# Patient Record
Sex: FEMALE | Race: OTHER | NOT HISPANIC OR LATINO | URBAN - METROPOLITAN AREA
[De-identification: names, ages, dates, MRNs, and addresses within clinical notes are randomized per-mention and may not be internally consistent; named-entity substitution may affect disease eponyms.]

---

## 2021-12-18 ENCOUNTER — EMERGENCY (EMERGENCY)
Facility: HOSPITAL | Age: 61
LOS: 1 days | Discharge: HOME | End: 2021-12-18
Attending: EMERGENCY MEDICINE
Payer: MEDICAID

## 2021-12-18 VITALS
OXYGEN SATURATION: 94 % | RESPIRATION RATE: 18 BRPM | DIASTOLIC BLOOD PRESSURE: 86 MMHG | SYSTOLIC BLOOD PRESSURE: 131 MMHG | WEIGHT: 132.06 LBS | HEART RATE: 85 BPM | TEMPERATURE: 98 F | HEIGHT: 62 IN

## 2021-12-18 DIAGNOSIS — Z96.659 PRESENCE OF UNSPECIFIED ARTIFICIAL KNEE JOINT: Chronic | ICD-10-CM

## 2021-12-18 LAB
ALBUMIN SERPL ELPH-MCNC: 3.6 G/DL — SIGNIFICANT CHANGE UP (ref 3.5–5)
ALP SERPL-CCNC: 60 U/L — SIGNIFICANT CHANGE UP (ref 40–120)
ALT FLD-CCNC: 31 U/L DA — SIGNIFICANT CHANGE UP (ref 10–60)
ANION GAP SERPL CALC-SCNC: 3 MMOL/L — LOW (ref 5–17)
APPEARANCE UR: CLEAR — SIGNIFICANT CHANGE UP
APTT BLD: 33.2 SEC — SIGNIFICANT CHANGE UP (ref 27.5–35.5)
AST SERPL-CCNC: 18 U/L — SIGNIFICANT CHANGE UP (ref 10–40)
BACTERIA # UR AUTO: ABNORMAL /HPF
BASOPHILS # BLD AUTO: 0.05 K/UL — SIGNIFICANT CHANGE UP (ref 0–0.2)
BASOPHILS NFR BLD AUTO: 0.8 % — SIGNIFICANT CHANGE UP (ref 0–2)
BILIRUB SERPL-MCNC: 0.4 MG/DL — SIGNIFICANT CHANGE UP (ref 0.2–1.2)
BILIRUB UR-MCNC: NEGATIVE — SIGNIFICANT CHANGE UP
BUN SERPL-MCNC: 8 MG/DL — SIGNIFICANT CHANGE UP (ref 7–18)
CALCIUM SERPL-MCNC: 9 MG/DL — SIGNIFICANT CHANGE UP (ref 8.4–10.5)
CHLORIDE SERPL-SCNC: 112 MMOL/L — HIGH (ref 96–108)
CO2 SERPL-SCNC: 28 MMOL/L — SIGNIFICANT CHANGE UP (ref 22–31)
COLOR SPEC: YELLOW — SIGNIFICANT CHANGE UP
CREAT SERPL-MCNC: 0.66 MG/DL — SIGNIFICANT CHANGE UP (ref 0.5–1.3)
D DIMER BLD IA.RAPID-MCNC: <150 NG/ML DDU — SIGNIFICANT CHANGE UP
DIFF PNL FLD: ABNORMAL
EOSINOPHIL # BLD AUTO: 0.16 K/UL — SIGNIFICANT CHANGE UP (ref 0–0.5)
EOSINOPHIL NFR BLD AUTO: 2.7 % — SIGNIFICANT CHANGE UP (ref 0–6)
EPI CELLS # UR: ABNORMAL /HPF
FIBRINOGEN PPP-MCNC: 428 MG/DL — SIGNIFICANT CHANGE UP (ref 290–520)
GLUCOSE SERPL-MCNC: 88 MG/DL — SIGNIFICANT CHANGE UP (ref 70–99)
GLUCOSE UR QL: NEGATIVE — SIGNIFICANT CHANGE UP
HCT VFR BLD CALC: 41.5 % — SIGNIFICANT CHANGE UP (ref 34.5–45)
HGB BLD-MCNC: 14.1 G/DL — SIGNIFICANT CHANGE UP (ref 11.5–15.5)
IMM GRANULOCYTES NFR BLD AUTO: 0.2 % — SIGNIFICANT CHANGE UP (ref 0–1.5)
INR BLD: 0.91 RATIO — SIGNIFICANT CHANGE UP (ref 0.88–1.16)
KETONES UR-MCNC: NEGATIVE — SIGNIFICANT CHANGE UP
LACTATE SERPL-SCNC: 2.2 MMOL/L — HIGH (ref 0.7–2)
LDH SERPL L TO P-CCNC: 185 U/L — SIGNIFICANT CHANGE UP (ref 120–225)
LEUKOCYTE ESTERASE UR-ACNC: NEGATIVE — SIGNIFICANT CHANGE UP
LYMPHOCYTES # BLD AUTO: 1.64 K/UL — SIGNIFICANT CHANGE UP (ref 1–3.3)
LYMPHOCYTES # BLD AUTO: 27.2 % — SIGNIFICANT CHANGE UP (ref 13–44)
MCHC RBC-ENTMCNC: 31.3 PG — SIGNIFICANT CHANGE UP (ref 27–34)
MCHC RBC-ENTMCNC: 34 GM/DL — SIGNIFICANT CHANGE UP (ref 32–36)
MCV RBC AUTO: 92.2 FL — SIGNIFICANT CHANGE UP (ref 80–100)
MONOCYTES # BLD AUTO: 0.4 K/UL — SIGNIFICANT CHANGE UP (ref 0–0.9)
MONOCYTES NFR BLD AUTO: 6.6 % — SIGNIFICANT CHANGE UP (ref 2–14)
NEUTROPHILS # BLD AUTO: 3.76 K/UL — SIGNIFICANT CHANGE UP (ref 1.8–7.4)
NEUTROPHILS NFR BLD AUTO: 62.5 % — SIGNIFICANT CHANGE UP (ref 43–77)
NITRITE UR-MCNC: NEGATIVE — SIGNIFICANT CHANGE UP
NRBC # BLD: 0 /100 WBCS — SIGNIFICANT CHANGE UP (ref 0–0)
PH UR: 7 — SIGNIFICANT CHANGE UP (ref 5–8)
PLATELET # BLD AUTO: 199 K/UL — SIGNIFICANT CHANGE UP (ref 150–400)
POTASSIUM SERPL-MCNC: 3.9 MMOL/L — SIGNIFICANT CHANGE UP (ref 3.5–5.3)
POTASSIUM SERPL-SCNC: 3.9 MMOL/L — SIGNIFICANT CHANGE UP (ref 3.5–5.3)
PROT SERPL-MCNC: 7.9 G/DL — SIGNIFICANT CHANGE UP (ref 6–8.3)
PROT UR-MCNC: NEGATIVE — SIGNIFICANT CHANGE UP
PROTHROM AB SERPL-ACNC: 10.9 SEC — SIGNIFICANT CHANGE UP (ref 10.6–13.6)
RBC # BLD: 4.5 M/UL — SIGNIFICANT CHANGE UP (ref 3.8–5.2)
RBC # FLD: 12.1 % — SIGNIFICANT CHANGE UP (ref 10.3–14.5)
RBC CASTS # UR COMP ASSIST: ABNORMAL /HPF (ref 0–2)
SODIUM SERPL-SCNC: 143 MMOL/L — SIGNIFICANT CHANGE UP (ref 135–145)
SP GR SPEC: 1.01 — SIGNIFICANT CHANGE UP (ref 1.01–1.02)
UROBILINOGEN FLD QL: NEGATIVE — SIGNIFICANT CHANGE UP
WBC # BLD: 6.02 K/UL — SIGNIFICANT CHANGE UP (ref 3.8–10.5)
WBC # FLD AUTO: 6.02 K/UL — SIGNIFICANT CHANGE UP (ref 3.8–10.5)
WBC UR QL: SIGNIFICANT CHANGE UP /HPF (ref 0–5)

## 2021-12-18 PROCEDURE — 85610 PROTHROMBIN TIME: CPT

## 2021-12-18 PROCEDURE — 83615 LACTATE (LD) (LDH) ENZYME: CPT

## 2021-12-18 PROCEDURE — 99284 EMERGENCY DEPT VISIT MOD MDM: CPT | Mod: 25

## 2021-12-18 PROCEDURE — 81001 URINALYSIS AUTO W/SCOPE: CPT

## 2021-12-18 PROCEDURE — 36415 COLL VENOUS BLD VENIPUNCTURE: CPT

## 2021-12-18 PROCEDURE — 86140 C-REACTIVE PROTEIN: CPT

## 2021-12-18 PROCEDURE — 71045 X-RAY EXAM CHEST 1 VIEW: CPT | Mod: 26

## 2021-12-18 PROCEDURE — 71045 X-RAY EXAM CHEST 1 VIEW: CPT

## 2021-12-18 PROCEDURE — 85025 COMPLETE CBC W/AUTO DIFF WBC: CPT

## 2021-12-18 PROCEDURE — 87040 BLOOD CULTURE FOR BACTERIA: CPT

## 2021-12-18 PROCEDURE — 96374 THER/PROPH/DIAG INJ IV PUSH: CPT

## 2021-12-18 PROCEDURE — 96375 TX/PRO/DX INJ NEW DRUG ADDON: CPT

## 2021-12-18 PROCEDURE — 99284 EMERGENCY DEPT VISIT MOD MDM: CPT | Mod: CS

## 2021-12-18 PROCEDURE — 84145 PROCALCITONIN (PCT): CPT

## 2021-12-18 PROCEDURE — 85379 FIBRIN DEGRADATION QUANT: CPT

## 2021-12-18 PROCEDURE — 85730 THROMBOPLASTIN TIME PARTIAL: CPT

## 2021-12-18 PROCEDURE — 85384 FIBRINOGEN ACTIVITY: CPT

## 2021-12-18 PROCEDURE — 82728 ASSAY OF FERRITIN: CPT

## 2021-12-18 PROCEDURE — 87086 URINE CULTURE/COLONY COUNT: CPT

## 2021-12-18 PROCEDURE — 80053 COMPREHEN METABOLIC PANEL: CPT

## 2021-12-18 PROCEDURE — 83605 ASSAY OF LACTIC ACID: CPT

## 2021-12-18 RX ORDER — CEFTRIAXONE 500 MG/1
1000 INJECTION, POWDER, FOR SOLUTION INTRAMUSCULAR; INTRAVENOUS ONCE
Refills: 0 | Status: COMPLETED | OUTPATIENT
Start: 2021-12-18 | End: 2021-12-18

## 2021-12-18 RX ORDER — AZITHROMYCIN 500 MG/1
500 TABLET, FILM COATED ORAL ONCE
Refills: 0 | Status: COMPLETED | OUTPATIENT
Start: 2021-12-18 | End: 2021-12-18

## 2021-12-18 RX ORDER — KETOROLAC TROMETHAMINE 30 MG/ML
30 SYRINGE (ML) INJECTION ONCE
Refills: 0 | Status: DISCONTINUED | OUTPATIENT
Start: 2021-12-18 | End: 2021-12-18

## 2021-12-18 RX ORDER — SODIUM CHLORIDE 9 MG/ML
1000 INJECTION INTRAMUSCULAR; INTRAVENOUS; SUBCUTANEOUS
Refills: 0 | Status: DISCONTINUED | OUTPATIENT
Start: 2021-12-18 | End: 2021-12-22

## 2021-12-18 RX ORDER — ACETAMINOPHEN 500 MG
650 TABLET ORAL ONCE
Refills: 0 | Status: COMPLETED | OUTPATIENT
Start: 2021-12-18 | End: 2021-12-18

## 2021-12-18 RX ADMIN — AZITHROMYCIN 255 MILLIGRAM(S): 500 TABLET, FILM COATED ORAL at 20:25

## 2021-12-18 RX ADMIN — SODIUM CHLORIDE 125 MILLILITER(S): 9 INJECTION INTRAMUSCULAR; INTRAVENOUS; SUBCUTANEOUS at 20:54

## 2021-12-18 RX ADMIN — CEFTRIAXONE 100 MILLIGRAM(S): 500 INJECTION, POWDER, FOR SOLUTION INTRAMUSCULAR; INTRAVENOUS at 20:25

## 2021-12-18 RX ADMIN — Medication 650 MILLIGRAM(S): at 20:25

## 2021-12-18 RX ADMIN — Medication 30 MILLIGRAM(S): at 20:54

## 2021-12-18 NOTE — ED ADULT NURSE NOTE - OBJECTIVE STATEMENT
Patient states that she was told by primary doctor to come to hospital after positive Covid test. She states she has bodyaches, dizziness. She is AAOx4, in no acute distress.

## 2021-12-18 NOTE — ED PROVIDER NOTE - PATIENT PORTAL LINK FT
You can access the FollowMyHealth Patient Portal offered by [facility] by registering at the following website: [email or web address]. By joining [facility]’s FollowMyHealth portal, you will also be able to view your health information using other applications (apps) compatible with our system.

## 2021-12-18 NOTE — ED PROVIDER NOTE - PROGRESS NOTE DETAILS
Pt in no distress, walked pt , O2 sat 95%, will d/c home.  Advised to quarantine for 10 days, f/u with PMD.  Also to monitor pulse ox & HR

## 2021-12-18 NOTE — ED PROVIDER NOTE - OBJECTIVE STATEMENT
61 y.o. female BIBA pt claims tested positive for COVID yest., pt c/o HA, dizziness, palpitations, inc. fatigue, myalgia,x3 days, good appetite, no sob, coughing, CP, n/v, diarrhea.  Pt has Moderna Booster.  Pt's living with her boyfriend.  Pt took Tylenol with improvement

## 2021-12-19 VITALS
HEART RATE: 88 BPM | SYSTOLIC BLOOD PRESSURE: 117 MMHG | RESPIRATION RATE: 18 BRPM | DIASTOLIC BLOOD PRESSURE: 81 MMHG | TEMPERATURE: 98 F | OXYGEN SATURATION: 96 %

## 2021-12-19 LAB
CRP SERPL-MCNC: <3 MG/L — SIGNIFICANT CHANGE UP
CULTURE RESULTS: SIGNIFICANT CHANGE UP
FERRITIN SERPL-MCNC: 60 NG/ML — SIGNIFICANT CHANGE UP (ref 15–150)
PROCALCITONIN SERPL-MCNC: 0.02 NG/ML — SIGNIFICANT CHANGE UP (ref 0.02–0.1)
SPECIMEN SOURCE: SIGNIFICANT CHANGE UP

## 2021-12-24 LAB
CULTURE RESULTS: SIGNIFICANT CHANGE UP
CULTURE RESULTS: SIGNIFICANT CHANGE UP
SPECIMEN SOURCE: SIGNIFICANT CHANGE UP
SPECIMEN SOURCE: SIGNIFICANT CHANGE UP

## 2022-12-19 ENCOUNTER — LABORATORY RESULT (OUTPATIENT)
Age: 62
End: 2022-12-19

## 2022-12-19 ENCOUNTER — APPOINTMENT (OUTPATIENT)
Dept: PULMONOLOGY | Facility: CLINIC | Age: 62
End: 2022-12-19

## 2022-12-19 ENCOUNTER — OUTPATIENT (OUTPATIENT)
Dept: OUTPATIENT SERVICES | Facility: HOSPITAL | Age: 62
LOS: 1 days | End: 2022-12-19
Payer: MEDICARE

## 2022-12-19 VITALS
OXYGEN SATURATION: 98 % | SYSTOLIC BLOOD PRESSURE: 117 MMHG | TEMPERATURE: 98.1 F | HEIGHT: 59 IN | DIASTOLIC BLOOD PRESSURE: 77 MMHG | RESPIRATION RATE: 12 BRPM | WEIGHT: 120 LBS | BODY MASS INDEX: 24.19 KG/M2 | HEART RATE: 67 BPM

## 2022-12-19 DIAGNOSIS — R06.00 DYSPNEA, UNSPECIFIED: ICD-10-CM

## 2022-12-19 DIAGNOSIS — Z96.659 PRESENCE OF UNSPECIFIED ARTIFICIAL KNEE JOINT: Chronic | ICD-10-CM

## 2022-12-19 DIAGNOSIS — Z23 ENCOUNTER FOR IMMUNIZATION: ICD-10-CM

## 2022-12-19 PROBLEM — Z00.00 ENCOUNTER FOR PREVENTIVE HEALTH EXAMINATION: Status: ACTIVE | Noted: 2022-12-19

## 2022-12-19 PROCEDURE — 71046 X-RAY EXAM CHEST 2 VIEWS: CPT

## 2022-12-19 PROCEDURE — 71046 X-RAY EXAM CHEST 2 VIEWS: CPT | Mod: 26

## 2022-12-19 PROCEDURE — 99205 OFFICE O/P NEW HI 60 MIN: CPT

## 2022-12-19 NOTE — ASSESSMENT
[FreeTextEntry1] : Impression/Plan:\par 1. Fatigue\par 2. Chronic Dyspnea on exertion post covid-19\par unclar etiology\par - check cxr, pft, 6mwt\par - check rheum work up given dry eyes, mouth, and ulnar deviation of the fingers\par \par RTC after completion of above. \par

## 2022-12-19 NOTE — HISTORY OF PRESENT ILLNESS
[TextBox_4] :  ID [id number]\par 61 yo F with past medical history of HTN, GERD, hyperlipidemia referred by PCP for evaluation of fatigue and shortness of breath. \par 1 year of fatigue, shortness of breath which worsened 2 week ago. She went ot [facility] for these symptoms and dc'ed from ER, told "everything ws normal". Does not have any dc papers from [place].  Reports that since getting covid-19 infection in   she has not been feeling well. With regards to covid-19 infection,  She was in the ER ut not admitted and did not receive any meds (paxlovid or monoclonal antibody). When she is not feeling tired, can walk 10 blocks and when is tired, cannnot even take a shower without feeling short of breath. ROS positive for dry eyes, dry mouth, redness of face during shower,  and feeling of "upper teeth are falling" and she has to constant keep her mouth open. \par PSxHx: knee surgery \par SH: never smoker. Born [place], been in the [place] 28 years. 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 no pets. \par Allergies: rifampin, azithromycin, paxil\par Meds: Carvedilol, diltiazem, omeprazole 20, aspirin, diphenhydramine, melatonin, atorvastatin, vitamin D3, bcomplex, citracal, folic acid  [ESS] : 0

## 2022-12-19 NOTE — PHYSICAL EXAM
[No Acute Distress] : no acute distress [Supple] : supple [Normal Rate/Rhythm] : normal rate/rhythm [Normal S1, S2] : normal s1, s2 [No Murmurs] : no murmurs [No Resp Distress] : no resp distress [No Acc Muscle Use] : no acc muscle use [Clear to Auscultation Bilaterally] : clear to auscultation bilaterally [Gait - Sufficient For Exercise Testing] : gait sufficient for exercise testing [No Clubbing] : no clubbing [No Edema] : no edema [Oriented x3] : oriented x3 [Normal Affect] : normal affect [TextBox_105] : b/l fingers have slight ulnar deviation,

## 2022-12-19 NOTE — CONSULT LETTER
[Dear  ___] : Dear  [unfilled], [Courtesy Letter:] : I had the pleasure of seeing your patient, [unfilled], in my office today. [Please see my note below.] : Please see my note below. [Consult Closing:] : Thank you very much for allowing me to participate in the care of this patient.  If you have any questions, please do not hesitate to contact me. [Sincerely,] : Sincerely, [DrJoanna  ___] : Dr. MARTÍNEZ [FreeTextEntry2] : Ryan Kohli  [FreeTextEntry3] : Dr Lopez

## 2022-12-19 NOTE — REVIEW OF SYSTEMS
[Fatigue] : fatigue [Dry Eyes] : dry eyes [SOB on Exertion] : sob on exertion [Dizziness] : dizziness [Negative] : Cardiovascular [Fever] : no fever [Cough] : no cough [Sputum] : no sputum [GERD] : no gerd [Abdominal Pain] : no abdominal pain [Diarrhea] : no diarrhea

## 2022-12-20 LAB
BASOPHILS # BLD AUTO: 0.04 K/UL
BASOPHILS NFR BLD AUTO: 0.7 %
C3 SERPL-MCNC: 121 MG/DL
EOSINOPHIL # BLD AUTO: 0.23 K/UL
EOSINOPHIL NFR BLD AUTO: 4 %
ERYTHROCYTE [SEDIMENTATION RATE] IN BLOOD BY WESTERGREN METHOD: 22 MM/HR
HCT VFR BLD CALC: 43.2 %
HGB BLD-MCNC: 14 G/DL
IMM GRANULOCYTES NFR BLD AUTO: 0.2 %
LYMPHOCYTES # BLD AUTO: 1.34 K/UL
LYMPHOCYTES NFR BLD AUTO: 23.5 %
MAN DIFF?: NORMAL
MCHC RBC-ENTMCNC: 30.7 PG
MCHC RBC-ENTMCNC: 32.4 GM/DL
MCV RBC AUTO: 94.7 FL
MONOCYTES # BLD AUTO: 0.32 K/UL
MONOCYTES NFR BLD AUTO: 5.6 %
NEUTROPHILS # BLD AUTO: 3.76 K/UL
NEUTROPHILS NFR BLD AUTO: 66 %
PLATELET # BLD AUTO: 164 K/UL
RBC # BLD: 4.56 M/UL
RBC # FLD: 12.4 %
RHEUMATOID FACT SER QL: <10 IU/ML
WBC # FLD AUTO: 5.7 K/UL

## 2022-12-21 LAB
ACE BLD-CCNC: 35 U/L
ANA SER IF-ACNC: NEGATIVE
CARDIOLIPIN IGM SER-MCNC: 9 MPL
CARDIOLIPIN IGM SER-MCNC: <5 GPL
CCP AB SER IA-ACNC: <8 UNITS
CENTROMERE IGG SER-ACNC: <0.2 CD:130001892
DEPRECATED CARDIOLIPIN IGA SER: 5.5 APL
DSDNA AB SER-ACNC: <12 IU/ML
ENA RNP AB SER IA-ACNC: 0.5 AL
ENA SCL70 IGG SER IA-ACNC: <0.2 AL
ENA SM AB SER IA-ACNC: <0.2 AL
ENA SS-A AB SER IA-ACNC: <0.2 AL
ENA SS-B AB SER IA-ACNC: <0.2 AL
M TB IFN-G BLD-IMP: NEGATIVE
QUANTIFERON TB PLUS MITOGEN MINUS NIL: 1.33 IU/ML
QUANTIFERON TB PLUS NIL: 0.03 IU/ML
QUANTIFERON TB PLUS TB1 MINUS NIL: 0 IU/ML
QUANTIFERON TB PLUS TB2 MINUS NIL: 0.02 IU/ML
RF+CCP IGG SER-IMP: NEGATIVE

## 2022-12-22 LAB
ALDOLASE SERPL-CCNC: 5.7 U/L
B2 GLYCOPROT1 IGM SER-ACNC: <5 SMU

## 2023-01-10 ENCOUNTER — APPOINTMENT (OUTPATIENT)
Dept: PULMONOLOGY | Facility: CLINIC | Age: 63
End: 2023-01-10
Payer: MEDICAID

## 2023-01-10 PROCEDURE — ZZZZZ: CPT

## 2023-01-10 PROCEDURE — 94010 BREATHING CAPACITY TEST: CPT

## 2023-01-10 PROCEDURE — 94727 GAS DIL/WSHOT DETER LNG VOL: CPT

## 2023-01-10 PROCEDURE — 94618 PULMONARY STRESS TESTING: CPT

## 2023-01-10 PROCEDURE — 94729 DIFFUSING CAPACITY: CPT

## 2023-01-17 ENCOUNTER — APPOINTMENT (OUTPATIENT)
Dept: PULMONOLOGY | Facility: CLINIC | Age: 63
End: 2023-01-17
Payer: MEDICAID

## 2023-01-17 VITALS
DIASTOLIC BLOOD PRESSURE: 81 MMHG | HEIGHT: 59 IN | WEIGHT: 121 LBS | OXYGEN SATURATION: 97 % | SYSTOLIC BLOOD PRESSURE: 131 MMHG | RESPIRATION RATE: 12 BRPM | TEMPERATURE: 97.9 F | BODY MASS INDEX: 24.39 KG/M2 | HEART RATE: 79 BPM

## 2023-01-17 DIAGNOSIS — R53.83 OTHER FATIGUE: ICD-10-CM

## 2023-01-17 DIAGNOSIS — G47.00 INSOMNIA, UNSPECIFIED: ICD-10-CM

## 2023-01-17 PROCEDURE — 99214 OFFICE O/P EST MOD 30 MIN: CPT

## 2023-01-17 NOTE — HISTORY OF PRESENT ILLNESS
[Never] : never [TextBox_4] :  ID [id number]\par 12-19-22 \par 63 yo F with past medical history of HTN, GERD, hyperlipidemia referred by PCP for evaluation of fatigue and shortness of breath. \par 1 year of fatigue, shortness of breath which worsened 2 week ago. She went ot [facility] for these symptoms and dc'ed from ER, told "everything ws normal". Does not have any dc papers from [place].  Reports that since getting covid-19 infection in   she has not been feeling well. With regards to covid-19 infection,  She was in the ER ut not admitted and did not receive any meds (paxlovid or monoclonal antibody). When she is not feeling tired, can walk 10 blocks and when is tired, cannnot even take a shower without feeling short of breath. ROS positive for dry eyes, dry mouth, redness of face during shower,  and feeling of "upper teeth are falling" and she has to constant keep her mouth open. \par PSxHx: knee surgery \par SH: never smoker. [place], [place] years. 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 no pets. \par Allergies: rifampin, azithromycin, paxil\par Meds: Carvedilol, diltiazem, omeprazole 20, aspirin, diphenhydramine, melatonin, atorvastatin, vitamin D3, bcomplex, citracal, folic acid \par \par 1-17-23 improved dyspnea on exertion. reports that when she does not sleep well she feel sfatigued. she does use melatonin almost nightly to sleep and overall has poor sleep hygeine.  [ESS] : 0

## 2023-01-17 NOTE — REVIEW OF SYSTEMS
[Dizziness] : dizziness [Negative] : Cardiovascular [Fever] : no fever [Fatigue] : no fatigue [Dry Eyes] : no dry eyes [Cough] : no cough [Sputum] : no sputum [SOB on Exertion] : no sob on exertion [GERD] : no gerd [Abdominal Pain] : no abdominal pain [Diarrhea] : no diarrhea

## 2023-01-17 NOTE — PHYSICAL EXAM
[No Acute Distress] : no acute distress [Supple] : supple [Normal Rate/Rhythm] : normal rate/rhythm [Normal S1, S2] : normal s1, s2 [No Resp Distress] : no resp distress [No Acc Muscle Use] : no acc muscle use [Clear to Auscultation Bilaterally] : clear to auscultation bilaterally [Gait - Sufficient For Exercise Testing] : gait sufficient for exercise testing [No Clubbing] : no clubbing [No Edema] : no edema [Oriented x3] : oriented x3 [Normal Affect] : normal affect [TextBox_105] : b/l fingers have slight ulnar deviation,

## 2023-01-17 NOTE — ASSESSMENT
[FreeTextEntry1] : Data Reviewed: \par Rheumatologic work up unremarkable\par CXR 12-19-22: degenerative changes in the spine. clear lung fields \par PFT 1-10-23 FVC 94%, FEV1 101%, FEV1/FVC 0.83. TLC 83%, DLCO 82%. NO exercise desaturation \par \par Impression/Plan:\par 1. Fatigue\par 2. Chronic Dyspnea on exertion post covid-19\par - pft, cxr and rheum work up all unremarakable\par - patient reports feeling better and she is not sure what changed\par - reassurance provided\par \par 3. insomnia\par - we discussed sleep hygeine \par - obtain HST \par \par Will discuss hst results over the phone. \par

## 2023-01-17 NOTE — CONSULT LETTER
[Dear  ___] : Dear  [unfilled], [Courtesy Letter:] : I had the pleasure of seeing your patient, [unfilled], in my office today. [Please see my note below.] : Please see my note below. [Consult Closing:] : Thank you very much for allowing me to participate in the care of this patient.  If you have any questions, please do not hesitate to contact me. [Sincerely,] : Sincerely, [DrJoanna  ___] : Dr. MARTÍNEZ [FreeTextEntry2] : Ryan Kohli  [FreeTextEntry3] : Ryan Kohli

## 2023-02-06 ENCOUNTER — EMERGENCY (EMERGENCY)
Facility: HOSPITAL | Age: 63
LOS: 1 days | Discharge: ANOTHER HOSPITAL | End: 2023-02-06
Attending: EMERGENCY MEDICINE
Payer: MEDICAID

## 2023-02-06 ENCOUNTER — INPATIENT (INPATIENT)
Facility: HOSPITAL | Age: 63
LOS: 2 days | Discharge: HOME | DRG: 185 | End: 2023-02-09
Attending: SURGERY | Admitting: SURGERY
Payer: MEDICAID

## 2023-02-06 VITALS
DIASTOLIC BLOOD PRESSURE: 63 MMHG | RESPIRATION RATE: 18 BRPM | SYSTOLIC BLOOD PRESSURE: 95 MMHG | HEART RATE: 99 BPM | TEMPERATURE: 100 F | OXYGEN SATURATION: 94 %

## 2023-02-06 VITALS
WEIGHT: 115.08 LBS | DIASTOLIC BLOOD PRESSURE: 72 MMHG | HEIGHT: 65 IN | HEART RATE: 70 BPM | SYSTOLIC BLOOD PRESSURE: 101 MMHG | RESPIRATION RATE: 18 BRPM | OXYGEN SATURATION: 95 % | TEMPERATURE: 98 F

## 2023-02-06 VITALS
HEART RATE: 100 BPM | SYSTOLIC BLOOD PRESSURE: 103 MMHG | OXYGEN SATURATION: 95 % | RESPIRATION RATE: 25 BRPM | DIASTOLIC BLOOD PRESSURE: 71 MMHG | TEMPERATURE: 101 F

## 2023-02-06 DIAGNOSIS — S22.42XA MULTIPLE FRACTURES OF RIBS, LEFT SIDE, INITIAL ENCOUNTER FOR CLOSED FRACTURE: ICD-10-CM

## 2023-02-06 DIAGNOSIS — Z96.659 PRESENCE OF UNSPECIFIED ARTIFICIAL KNEE JOINT: Chronic | ICD-10-CM

## 2023-02-06 LAB
ALBUMIN SERPL ELPH-MCNC: 3.3 G/DL — LOW (ref 3.5–5)
ALBUMIN SERPL ELPH-MCNC: 3.9 G/DL — SIGNIFICANT CHANGE UP (ref 3.3–5)
ALP SERPL-CCNC: 47 U/L — SIGNIFICANT CHANGE UP (ref 40–120)
ALP SERPL-CCNC: 53 U/L — SIGNIFICANT CHANGE UP (ref 40–120)
ALT FLD-CCNC: 22 U/L — SIGNIFICANT CHANGE UP (ref 10–45)
ALT FLD-CCNC: 32 U/L DA — SIGNIFICANT CHANGE UP (ref 10–60)
ANION GAP SERPL CALC-SCNC: 14 MMOL/L — SIGNIFICANT CHANGE UP (ref 5–17)
ANION GAP SERPL CALC-SCNC: 8 MMOL/L — SIGNIFICANT CHANGE UP (ref 5–17)
APPEARANCE UR: CLEAR — SIGNIFICANT CHANGE UP
AST SERPL-CCNC: 22 U/L — SIGNIFICANT CHANGE UP (ref 10–40)
AST SERPL-CCNC: 30 U/L — SIGNIFICANT CHANGE UP (ref 10–40)
BASOPHILS # BLD AUTO: 0.01 K/UL — SIGNIFICANT CHANGE UP (ref 0–0.2)
BASOPHILS # BLD AUTO: 0.01 K/UL — SIGNIFICANT CHANGE UP (ref 0–0.2)
BASOPHILS NFR BLD AUTO: 0.1 % — SIGNIFICANT CHANGE UP (ref 0–2)
BASOPHILS NFR BLD AUTO: 0.1 % — SIGNIFICANT CHANGE UP (ref 0–2)
BILIRUB SERPL-MCNC: 0.3 MG/DL — SIGNIFICANT CHANGE UP (ref 0.2–1.2)
BILIRUB SERPL-MCNC: 0.5 MG/DL — SIGNIFICANT CHANGE UP (ref 0.2–1.2)
BILIRUB UR-MCNC: NEGATIVE — SIGNIFICANT CHANGE UP
BLD GP AB SCN SERPL QL: NEGATIVE — SIGNIFICANT CHANGE UP
BUN SERPL-MCNC: 21 MG/DL — HIGH (ref 7–18)
BUN SERPL-MCNC: 23 MG/DL — SIGNIFICANT CHANGE UP (ref 7–23)
CALCIUM SERPL-MCNC: 8.4 MG/DL — SIGNIFICANT CHANGE UP (ref 8.4–10.5)
CALCIUM SERPL-MCNC: 8.4 MG/DL — SIGNIFICANT CHANGE UP (ref 8.4–10.5)
CHLORIDE SERPL-SCNC: 107 MMOL/L — SIGNIFICANT CHANGE UP (ref 96–108)
CHLORIDE SERPL-SCNC: 110 MMOL/L — HIGH (ref 96–108)
CO2 SERPL-SCNC: 20 MMOL/L — LOW (ref 22–31)
CO2 SERPL-SCNC: 22 MMOL/L — SIGNIFICANT CHANGE UP (ref 22–31)
COLOR SPEC: YELLOW — SIGNIFICANT CHANGE UP
CREAT SERPL-MCNC: 0.66 MG/DL — SIGNIFICANT CHANGE UP (ref 0.5–1.3)
CREAT SERPL-MCNC: 0.72 MG/DL — SIGNIFICANT CHANGE UP (ref 0.5–1.3)
DIFF PNL FLD: ABNORMAL
EGFR: 94 ML/MIN/1.73M2 — SIGNIFICANT CHANGE UP
EGFR: 99 ML/MIN/1.73M2 — SIGNIFICANT CHANGE UP
EOSINOPHIL # BLD AUTO: 0.01 K/UL — SIGNIFICANT CHANGE UP (ref 0–0.5)
EOSINOPHIL # BLD AUTO: 0.05 K/UL — SIGNIFICANT CHANGE UP (ref 0–0.5)
EOSINOPHIL NFR BLD AUTO: 0.1 % — SIGNIFICANT CHANGE UP (ref 0–6)
EOSINOPHIL NFR BLD AUTO: 0.6 % — SIGNIFICANT CHANGE UP (ref 0–6)
FLUAV AG NPH QL: SIGNIFICANT CHANGE UP
FLUAV AG NPH QL: SIGNIFICANT CHANGE UP
FLUBV AG NPH QL: SIGNIFICANT CHANGE UP
FLUBV AG NPH QL: SIGNIFICANT CHANGE UP
GLUCOSE SERPL-MCNC: 135 MG/DL — HIGH (ref 70–99)
GLUCOSE SERPL-MCNC: 207 MG/DL — HIGH (ref 70–99)
GLUCOSE UR QL: NEGATIVE — SIGNIFICANT CHANGE UP
HCT VFR BLD CALC: 39.3 % — SIGNIFICANT CHANGE UP (ref 34.5–45)
HCT VFR BLD CALC: 41.7 % — SIGNIFICANT CHANGE UP (ref 34.5–45)
HGB BLD-MCNC: 13.4 G/DL — SIGNIFICANT CHANGE UP (ref 11.5–15.5)
HGB BLD-MCNC: 14.3 G/DL — SIGNIFICANT CHANGE UP (ref 11.5–15.5)
IMM GRANULOCYTES NFR BLD AUTO: 0.3 % — SIGNIFICANT CHANGE UP (ref 0–0.9)
IMM GRANULOCYTES NFR BLD AUTO: 0.3 % — SIGNIFICANT CHANGE UP (ref 0–0.9)
KETONES UR-MCNC: NEGATIVE — SIGNIFICANT CHANGE UP
LEUKOCYTE ESTERASE UR-ACNC: ABNORMAL
LIDOCAIN IGE QN: 17 U/L — SIGNIFICANT CHANGE UP (ref 7–60)
LYMPHOCYTES # BLD AUTO: 0.16 K/UL — LOW (ref 1–3.3)
LYMPHOCYTES # BLD AUTO: 0.21 K/UL — LOW (ref 1–3.3)
LYMPHOCYTES # BLD AUTO: 2.3 % — LOW (ref 13–44)
LYMPHOCYTES # BLD AUTO: 2.6 % — LOW (ref 13–44)
MCHC RBC-ENTMCNC: 31.8 PG — SIGNIFICANT CHANGE UP (ref 27–34)
MCHC RBC-ENTMCNC: 31.8 PG — SIGNIFICANT CHANGE UP (ref 27–34)
MCHC RBC-ENTMCNC: 34.1 GM/DL — SIGNIFICANT CHANGE UP (ref 32–36)
MCHC RBC-ENTMCNC: 34.3 GM/DL — SIGNIFICANT CHANGE UP (ref 32–36)
MCV RBC AUTO: 92.9 FL — SIGNIFICANT CHANGE UP (ref 80–100)
MCV RBC AUTO: 93.1 FL — SIGNIFICANT CHANGE UP (ref 80–100)
MONOCYTES # BLD AUTO: 0.14 K/UL — SIGNIFICANT CHANGE UP (ref 0–0.9)
MONOCYTES # BLD AUTO: 0.14 K/UL — SIGNIFICANT CHANGE UP (ref 0–0.9)
MONOCYTES NFR BLD AUTO: 1.8 % — LOW (ref 2–14)
MONOCYTES NFR BLD AUTO: 2 % — SIGNIFICANT CHANGE UP (ref 2–14)
NEUTROPHILS # BLD AUTO: 6.62 K/UL — SIGNIFICANT CHANGE UP (ref 1.8–7.4)
NEUTROPHILS # BLD AUTO: 7.56 K/UL — HIGH (ref 1.8–7.4)
NEUTROPHILS NFR BLD AUTO: 94.6 % — HIGH (ref 43–77)
NEUTROPHILS NFR BLD AUTO: 95.2 % — HIGH (ref 43–77)
NITRITE UR-MCNC: NEGATIVE — SIGNIFICANT CHANGE UP
NRBC # BLD: 0 /100 WBCS — SIGNIFICANT CHANGE UP (ref 0–0)
NRBC # BLD: 0 /100 WBCS — SIGNIFICANT CHANGE UP (ref 0–0)
PH UR: 5 — SIGNIFICANT CHANGE UP (ref 5–8)
PLATELET # BLD AUTO: 161 K/UL — SIGNIFICANT CHANGE UP (ref 150–400)
PLATELET # BLD AUTO: 162 K/UL — SIGNIFICANT CHANGE UP (ref 150–400)
POTASSIUM SERPL-MCNC: 3.7 MMOL/L — SIGNIFICANT CHANGE UP (ref 3.5–5.3)
POTASSIUM SERPL-MCNC: 4.3 MMOL/L — SIGNIFICANT CHANGE UP (ref 3.5–5.3)
POTASSIUM SERPL-SCNC: 3.7 MMOL/L — SIGNIFICANT CHANGE UP (ref 3.5–5.3)
POTASSIUM SERPL-SCNC: 4.3 MMOL/L — SIGNIFICANT CHANGE UP (ref 3.5–5.3)
PROT SERPL-MCNC: 6.7 G/DL — SIGNIFICANT CHANGE UP (ref 6–8.3)
PROT SERPL-MCNC: 6.8 G/DL — SIGNIFICANT CHANGE UP (ref 6–8.3)
PROT UR-MCNC: 30 MG/DL
RBC # BLD: 4.22 M/UL — SIGNIFICANT CHANGE UP (ref 3.8–5.2)
RBC # BLD: 4.49 M/UL — SIGNIFICANT CHANGE UP (ref 3.8–5.2)
RBC # FLD: 12.1 % — SIGNIFICANT CHANGE UP (ref 10.3–14.5)
RBC # FLD: 12.5 % — SIGNIFICANT CHANGE UP (ref 10.3–14.5)
RH IG SCN BLD-IMP: POSITIVE — SIGNIFICANT CHANGE UP
RSV RNA NPH QL NAA+NON-PROBE: SIGNIFICANT CHANGE UP
SARS-COV-2 RNA SPEC QL NAA+PROBE: SIGNIFICANT CHANGE UP
SARS-COV-2 RNA SPEC QL NAA+PROBE: SIGNIFICANT CHANGE UP
SODIUM SERPL-SCNC: 140 MMOL/L — SIGNIFICANT CHANGE UP (ref 135–145)
SODIUM SERPL-SCNC: 141 MMOL/L — SIGNIFICANT CHANGE UP (ref 135–145)
SP GR SPEC: 1.02 — SIGNIFICANT CHANGE UP (ref 1.01–1.02)
TROPONIN I, HIGH SENSITIVITY RESULT: 4.7 NG/L — SIGNIFICANT CHANGE UP
UROBILINOGEN FLD QL: NEGATIVE — SIGNIFICANT CHANGE UP
WBC # BLD: 6.96 K/UL — SIGNIFICANT CHANGE UP (ref 3.8–10.5)
WBC # BLD: 7.99 K/UL — SIGNIFICANT CHANGE UP (ref 3.8–10.5)
WBC # FLD AUTO: 6.96 K/UL — SIGNIFICANT CHANGE UP (ref 3.8–10.5)
WBC # FLD AUTO: 7.99 K/UL — SIGNIFICANT CHANGE UP (ref 3.8–10.5)

## 2023-02-06 PROCEDURE — 71250 CT THORAX DX C-: CPT | Mod: MA

## 2023-02-06 PROCEDURE — 93010 ELECTROCARDIOGRAM REPORT: CPT

## 2023-02-06 PROCEDURE — 71250 CT THORAX DX C-: CPT | Mod: 26,MA

## 2023-02-06 PROCEDURE — 99283 EMERGENCY DEPT VISIT LOW MDM: CPT

## 2023-02-06 PROCEDURE — 96374 THER/PROPH/DIAG INJ IV PUSH: CPT

## 2023-02-06 PROCEDURE — 87637 SARSCOV2&INF A&B&RSV AMP PRB: CPT

## 2023-02-06 PROCEDURE — 85025 COMPLETE CBC W/AUTO DIFF WBC: CPT

## 2023-02-06 PROCEDURE — 74177 CT ABD & PELVIS W/CONTRAST: CPT | Mod: 26

## 2023-02-06 PROCEDURE — 36415 COLL VENOUS BLD VENIPUNCTURE: CPT

## 2023-02-06 PROCEDURE — 87086 URINE CULTURE/COLONY COUNT: CPT

## 2023-02-06 PROCEDURE — 99222 1ST HOSP IP/OBS MODERATE 55: CPT | Mod: GC

## 2023-02-06 PROCEDURE — 71260 CT THORAX DX C+: CPT | Mod: 26

## 2023-02-06 PROCEDURE — 71045 X-RAY EXAM CHEST 1 VIEW: CPT | Mod: 26

## 2023-02-06 PROCEDURE — 70450 CT HEAD/BRAIN W/O DYE: CPT | Mod: 26,MA

## 2023-02-06 PROCEDURE — 96376 TX/PRO/DX INJ SAME DRUG ADON: CPT

## 2023-02-06 PROCEDURE — 82962 GLUCOSE BLOOD TEST: CPT

## 2023-02-06 PROCEDURE — 96375 TX/PRO/DX INJ NEW DRUG ADDON: CPT

## 2023-02-06 PROCEDURE — 80053 COMPREHEN METABOLIC PANEL: CPT

## 2023-02-06 PROCEDURE — 99285 EMERGENCY DEPT VISIT HI MDM: CPT

## 2023-02-06 PROCEDURE — 72125 CT NECK SPINE W/O DYE: CPT | Mod: 26,MA

## 2023-02-06 PROCEDURE — 81001 URINALYSIS AUTO W/SCOPE: CPT

## 2023-02-06 PROCEDURE — 84484 ASSAY OF TROPONIN QUANT: CPT

## 2023-02-06 PROCEDURE — 99285 EMERGENCY DEPT VISIT HI MDM: CPT | Mod: 25

## 2023-02-06 PROCEDURE — 71045 X-RAY EXAM CHEST 1 VIEW: CPT

## 2023-02-06 PROCEDURE — 70450 CT HEAD/BRAIN W/O DYE: CPT | Mod: MA

## 2023-02-06 PROCEDURE — 93005 ELECTROCARDIOGRAM TRACING: CPT

## 2023-02-06 PROCEDURE — 72125 CT NECK SPINE W/O DYE: CPT | Mod: MA

## 2023-02-06 RX ORDER — POLYETHYLENE GLYCOL 3350 17 G/17G
17 POWDER, FOR SOLUTION ORAL EVERY 24 HOURS
Refills: 0 | Status: DISCONTINUED | OUTPATIENT
Start: 2023-02-06 | End: 2023-02-09

## 2023-02-06 RX ORDER — LIDOCAINE 4 G/100G
1 CREAM TOPICAL ONCE
Refills: 0 | Status: COMPLETED | OUTPATIENT
Start: 2023-02-06 | End: 2023-02-06

## 2023-02-06 RX ORDER — ACETAMINOPHEN 500 MG
975 TABLET ORAL EVERY 6 HOURS
Refills: 0 | Status: DISCONTINUED | OUTPATIENT
Start: 2023-02-06 | End: 2023-02-09

## 2023-02-06 RX ORDER — ACETAMINOPHEN 500 MG
1000 TABLET ORAL ONCE
Refills: 0 | Status: COMPLETED | OUTPATIENT
Start: 2023-02-06 | End: 2023-02-06

## 2023-02-06 RX ORDER — PANTOPRAZOLE SODIUM 20 MG/1
40 TABLET, DELAYED RELEASE ORAL
Refills: 0 | Status: DISCONTINUED | OUTPATIENT
Start: 2023-02-06 | End: 2023-02-09

## 2023-02-06 RX ORDER — SODIUM CHLORIDE 9 MG/ML
1000 INJECTION INTRAMUSCULAR; INTRAVENOUS; SUBCUTANEOUS ONCE
Refills: 0 | Status: COMPLETED | OUTPATIENT
Start: 2023-02-06 | End: 2023-02-06

## 2023-02-06 RX ORDER — KETOROLAC TROMETHAMINE 30 MG/ML
30 SYRINGE (ML) INJECTION ONCE
Refills: 0 | Status: DISCONTINUED | OUTPATIENT
Start: 2023-02-06 | End: 2023-02-06

## 2023-02-06 RX ORDER — MORPHINE SULFATE 50 MG/1
4 CAPSULE, EXTENDED RELEASE ORAL ONCE
Refills: 0 | Status: DISCONTINUED | OUTPATIENT
Start: 2023-02-06 | End: 2023-02-06

## 2023-02-06 RX ORDER — ONDANSETRON 8 MG/1
4 TABLET, FILM COATED ORAL ONCE
Refills: 0 | Status: COMPLETED | OUTPATIENT
Start: 2023-02-06 | End: 2023-02-06

## 2023-02-06 RX ORDER — OXYCODONE HYDROCHLORIDE 5 MG/1
2.5 TABLET ORAL EVERY 6 HOURS
Refills: 0 | Status: DISCONTINUED | OUTPATIENT
Start: 2023-02-06 | End: 2023-02-08

## 2023-02-06 RX ORDER — MORPHINE SULFATE 50 MG/1
2 CAPSULE, EXTENDED RELEASE ORAL ONCE
Refills: 0 | Status: DISCONTINUED | OUTPATIENT
Start: 2023-02-06 | End: 2023-02-06

## 2023-02-06 RX ORDER — SENNA PLUS 8.6 MG/1
2 TABLET ORAL AT BEDTIME
Refills: 0 | Status: DISCONTINUED | OUTPATIENT
Start: 2023-02-06 | End: 2023-02-09

## 2023-02-06 RX ORDER — OXYCODONE HYDROCHLORIDE 5 MG/1
5 TABLET ORAL EVERY 6 HOURS
Refills: 0 | Status: DISCONTINUED | OUTPATIENT
Start: 2023-02-06 | End: 2023-02-08

## 2023-02-06 RX ORDER — CARVEDILOL PHOSPHATE 80 MG/1
6.25 CAPSULE, EXTENDED RELEASE ORAL EVERY 12 HOURS
Refills: 0 | Status: DISCONTINUED | OUTPATIENT
Start: 2023-02-06 | End: 2023-02-09

## 2023-02-06 RX ORDER — LIDOCAINE 4 G/100G
1 CREAM TOPICAL DAILY
Refills: 0 | Status: DISCONTINUED | OUTPATIENT
Start: 2023-02-07 | End: 2023-02-09

## 2023-02-06 RX ORDER — ENOXAPARIN SODIUM 100 MG/ML
40 INJECTION SUBCUTANEOUS EVERY 24 HOURS
Refills: 0 | Status: DISCONTINUED | OUTPATIENT
Start: 2023-02-06 | End: 2023-02-09

## 2023-02-06 RX ORDER — KETOROLAC TROMETHAMINE 30 MG/ML
15 SYRINGE (ML) INJECTION ONCE
Refills: 0 | Status: DISCONTINUED | OUTPATIENT
Start: 2023-02-06 | End: 2023-02-06

## 2023-02-06 RX ADMIN — Medication 975 MILLIGRAM(S): at 23:21

## 2023-02-06 RX ADMIN — SODIUM CHLORIDE 1000 MILLILITER(S): 9 INJECTION INTRAMUSCULAR; INTRAVENOUS; SUBCUTANEOUS at 11:25

## 2023-02-06 RX ADMIN — Medication 1000 MILLIGRAM(S): at 12:15

## 2023-02-06 RX ADMIN — Medication 15 MILLIGRAM(S): at 17:46

## 2023-02-06 RX ADMIN — CARVEDILOL PHOSPHATE 6.25 MILLIGRAM(S): 80 CAPSULE, EXTENDED RELEASE ORAL at 16:56

## 2023-02-06 RX ADMIN — LIDOCAINE 1 PATCH: 4 CREAM TOPICAL at 06:16

## 2023-02-06 RX ADMIN — Medication 975 MILLIGRAM(S): at 18:56

## 2023-02-06 RX ADMIN — MORPHINE SULFATE 4 MILLIGRAM(S): 50 CAPSULE, EXTENDED RELEASE ORAL at 09:50

## 2023-02-06 RX ADMIN — ONDANSETRON 4 MILLIGRAM(S): 8 TABLET, FILM COATED ORAL at 03:53

## 2023-02-06 RX ADMIN — SODIUM CHLORIDE 1000 MILLILITER(S): 9 INJECTION INTRAMUSCULAR; INTRAVENOUS; SUBCUTANEOUS at 12:16

## 2023-02-06 RX ADMIN — ENOXAPARIN SODIUM 40 MILLIGRAM(S): 100 INJECTION SUBCUTANEOUS at 16:52

## 2023-02-06 RX ADMIN — Medication 30 MILLIGRAM(S): at 06:57

## 2023-02-06 RX ADMIN — OXYCODONE HYDROCHLORIDE 5 MILLIGRAM(S): 5 TABLET ORAL at 23:22

## 2023-02-06 RX ADMIN — OXYCODONE HYDROCHLORIDE 5 MILLIGRAM(S): 5 TABLET ORAL at 16:52

## 2023-02-06 RX ADMIN — OXYCODONE HYDROCHLORIDE 5 MILLIGRAM(S): 5 TABLET ORAL at 17:46

## 2023-02-06 RX ADMIN — Medication 1000 MILLIGRAM(S): at 12:16

## 2023-02-06 RX ADMIN — Medication 975 MILLIGRAM(S): at 19:00

## 2023-02-06 RX ADMIN — PANTOPRAZOLE SODIUM 40 MILLIGRAM(S): 20 TABLET, DELAYED RELEASE ORAL at 16:55

## 2023-02-06 RX ADMIN — MORPHINE SULFATE 2 MILLIGRAM(S): 50 CAPSULE, EXTENDED RELEASE ORAL at 03:27

## 2023-02-06 RX ADMIN — Medication 400 MILLIGRAM(S): at 11:23

## 2023-02-06 RX ADMIN — Medication 15 MILLIGRAM(S): at 11:22

## 2023-02-06 NOTE — H&P ADULT - ASSESSMENT
62F presenting after ground level fall with the following injuries, rib score 1, IS 1000:    - Left 6-9 anterior rib fx  - Incidentally found gastric wall thickening    Plan:  - Admit to Dr. Cantu  - Meng protocol management, avoiding NSAIDS per GI recs  - Home carvedilol  - FU GI re further workup of gastric wall thickening  - Diet  - Bowel regimen  - Lovenox ppx  - Am labs  - PT and tertiary in am    Patient seen and discussed with Dr. Pepe Rinaldi  ATP  [id number]

## 2023-02-06 NOTE — H&P ADULT - HISTORY OF PRESENT ILLNESS
ID: [id number]    62F pmh acid reflux, HTN presenting as transfer from [facility] after ground level fall at 1am on 2/6. Patient reports she fell after getting up from bed to go to the bathroom, feels that the melatonin she took made her dizzy which she reports has happened in the past, however denies previous falls. No HS, No LOC. Called for boyfriend to help her up.    On arrival, primary intact, secondary significant for left chest wall tenderness.

## 2023-02-06 NOTE — ED ADULT NURSE NOTE - OBJECTIVE STATEMENT
Patient presented to the ED with c/o left rib pain s/p fall. As per pt she felt dizzy before she felt.

## 2023-02-06 NOTE — ED PROVIDER NOTE - ATTENDING APP SHARED VISIT CONTRIBUTION OF CARE
Left chest pain after fall.  Here for rib fractures.  Will screen for infection given the fever.  Urinalysis, repeat the blood work, IV Tylenol and ketorolac, chest x-ray, discussing with surgery, likely admit the patient to the hospital.  I reviewed the chart from the outside facility and the patient has 4 rib fractures with no pneumothorax or hemothorax.  They did not do abdominal imaging.  Head and C-spine imaging are essentially normal.  There is concern for gastric cancer and I consulted GI in the ER based on the patient.  The patient's primary care doctor stated that Gabriela Ariza [phone] is the primary GI doctor however when I called his office they stated that the patient does not follow with him and cannot find the patient either by last name or date of birth.

## 2023-02-06 NOTE — ED PROVIDER NOTE - OBJECTIVE STATEMENT
63 y/o female, BIBEMS as a transfer from [facility] s/p multiple left rib fractures. patient states 1 hour prior to arrival at [facility] ED patient felt chills and weakness and fell while walking to the bathroom. states she experienced LOC. states her boyfriend was present to help her. at [facility] was found to have nondisplaced fractures of the left anterolateral 6,7,8,9 ribs. no pneumothorax was appreciated. patient complains of left rib pain at this time.

## 2023-02-06 NOTE — ED PROVIDER NOTE - CARE PLAN
1 Principal Discharge DX:	Fracture of four ribs of left side  Secondary Diagnosis:	Gastric wall thickening

## 2023-02-06 NOTE — CONSULT NOTE ADULT - ASSESSMENT
Juan Pablo Bowie is a 62 year old female with a PMHx of HTN, HLD and GERD presenting as a transfer from  [facility] after fall with LOC s/p multiple left rib fractures with hospital course c/b new onset fevers. GI consulted for consideration of EGD for gastric wall thickening noted on CT.    #Gastric wall thickening noted on CT  Incidentally found to have nonspecific irregular wall thickening of the gastric body on CT chest obtained for traumatic workup as outlined above. Pt overall without any ongoing constitutional symptoms including early satiety, weight loss, n/v/abdominal pain with no ongoing melena/h-cehzia. Last EGD around 2 years ago and reportedly normal per pt. Risk factors of PUD including ongoing ASA use. Overall,  irregular wall thickening of the gastric body relatively non-specific. Can plan for endoscopic evaluation further workup of the above  once more medically optimized  from surgical perspective re: new left sided fractures and infectious workup given new fevers in the ED>     Recommendation:  -Diet as tolerated   -Can plan for endoscopic evaluation further workup of the above  once more medically optimized re: fractures and fevers   -PO PPI QD   -Avoid NSAIDs   -Please obtain records from primary GI Dr. Gabriela Arechiga     All recommendations are tentative until note is attested by attending.     Loedan Melo, PGY-4  Gastroenterology/Hepatology Fellow  Available on [employer] Teams   [phone] (Long Range Pager)  [phone] (Short Range Pager [facility])    After 5pm, please contact the on-call GI fellow. [phone]   Juan Pablo Bowie is a 62 year old female with a PMHx of HTN, HLD and GERD presenting as a transfer from  [facility] after fall with LOC s/p multiple left rib fractures with hospital course c/b new onset fevers. GI consulted for consideration of EGD for gastric wall thickening noted on CT.    #Gastric wall thickening noted on CT - ddx includes gastritis, PUD, gastric neoplasms  Incidentally found to have nonspecific irregular wall thickening of the gastric body on CT chest obtained for traumatic workup as outlined above. Pt overall without any ongoing constitutional symptoms including early satiety, weight loss, n/v/abdominal pain with no ongoing melena/h-cehzia. Last EGD around 2 years ago and reportedly normal per pt. Risk factors of PUD including ongoing ASA use. Overall,  irregular wall thickening of the gastric body relatively non-specific. Can plan for endoscopic evaluation further workup of the above  once more medically optimized  from surgical perspective re: new left sided fractures and infectious workup given new fevers in the ED>   #Fever    Recommendation:  -Diet as tolerated   -Recommend fever workup - pan-culture  -Can plan for endoscopic evaluation further workup of the above  once more medically optimized re: fractures and fevers   -PO PPI QD   -Avoid NSAIDs   -Please obtain records from primary GI Dr. Gabriela Arechiga     All recommendations are tentative until note is attested by attending.     Leodan Melo, PGY-4  Gastroenterology/Hepatology Fellow  Available on [employer] Teams   [phone] (Long Range Pager)  [phone] (Short Range Pager [facility])    After 5pm, please contact the on-call GI fellow. [phone]

## 2023-02-06 NOTE — CONSULT NOTE ADULT - SUBJECTIVE AND OBJECTIVE BOX
HPI:  Juan Pablo Bowie is a 62 year old female with a PMHx of HTN, HLD and GERD presenting as a transfer from  [facility] after fall with LOC s/p multiple left rib fractures with hospital course c/b new onset fevers. GI consulted for consideration of EGD for gastric wall thickening noted on CT.     As noted above, Ms. Bowie states that she was feeling overall very weak with some chills and light headedness. As she was walking to the bathroom she felt very wobbly on her feet and experienced LOC and was found on the floor by her boyfriend unclear how long she was on the floor. She went to  with traumatic survey notable for CT with nondisplaced fractures of the left anterolateral 6,7,8,9 ribs. She was incidentally found to have irregular wall thickening of the gastric body. Prior to this admission she denied any ongoing n/v/abdominal pain. Denies any ongoing weight loss, early satiety with no ongoing melena or h-chezia. Pt does note EGD around 2 years ago that was reportedly normal but not sure why it was done. She does take ASA every othre day for CV disease ppx but denies any other AP or AC use. No fam hx of GI malignancies.     Allergies:  No Known Allergies    Home Medications:  ASA every other day   Carvedilol   Omeprazole     Hospital Medications:  lidocaine   4% Patch 1 Patch Transdermal Once    PMHX/PSHX:    HTN   HLD   GERD     Family history:  No family hx of esophogeal gastric or CRC     Social History:   Tob: Denies  EtOH: Denies  Illicit Drugs: Denies    ROS: Complete and normal except as mentioned above    PHYSICAL EXAM:   GENERAL  No acute distress  HEENT:  NCAT, no scleral icterus   CHEST:  no respiratory distress  HEART:  Regular rate and rhythm. +TTP over the left ribs   ABDOMEN:  Soft, non-tender, non-distended, normoactive bowel sounds  EXTREMITIES: No edema  NEURO:  Alert and oriented x 3    Vital Signs:  Vital Signs Last 24 Hrs  T(C): 37.9 (06 Feb 2023 10:26), Max: 37.9 (06 Feb 2023 10:26)  T(F): 100.3 (06 Feb 2023 10:26), Max: 100.3 (06 Feb 2023 10:26)  HR: 112 (06 Feb 2023 11:36) (99 - 112)  BP: 98/56 (06 Feb 2023 11:36) (95/63 - 98/56)  BP(mean): --  RR: 16 (06 Feb 2023 11:36) (16 - 18)  SpO2: 98% (06 Feb 2023 11:36) (94% - 98%)    Parameters below as of 06 Feb 2023 11:36  Patient On (Oxygen Delivery Method): room air        LABS:                        13.4   6.96  )-----------( 161      ( 06 Feb 2023 11:39 )             39.3     Mean Cell Volume: 93.1 fl (02-06-23 @ 11:39)    02-06    141  |  107  |  23  ----------------------------<  135<H>  3.7   |  20<L>  |  0.66    Ca    8.4      06 Feb 2023 11:39    TPro  6.7  /  Alb  3.9  /  TBili  0.5  /  DBili  x   /  AST  22  /  ALT  22  /  AlkPhos  47  02-06    LIVER FUNCTIONS - ( 06 Feb 2023 11:39 )  Alb: 3.9 g/dL / Pro: 6.7 g/dL / ALK PHOS: 47 U/L / ALT: 22 U/L / AST: 22 U/L / GGT: x               Lipase serum17                                 13.4   6.96  )-----------( 161      ( 06 Feb 2023 11:39 )             39.3       Imaging:  CT Chest   IMPRESSION:  Nondisplaced fractures of the left anterolateral sixth, seventh, eighth   and ninth ribs. No pneumothorax.    Nonspecific irregular wall thickening of the gastric body. Neoplasm   cannot be excluded. Correlation with endoscopy is suggested.           Prydeinig  used for interview    HPI:  Juan Pablo Bowie is a 62 year old female with a PMHx of HTN, HLD and GERD presenting as a transfer from  [facility] after fall with LOC s/p multiple left rib fractures with hospital course c/b new onset fevers. GI consulted for consideration of EGD for gastric wall thickening noted on CT.     As noted above, Ms. Bowie states that she was feeling overall very weak with some chills and light headedness. As she was walking to the bathroom she felt very wobbly on her feet and experienced LOC and was found on the floor by her boyfriend unclear how long she was on the floor. She went to  with traumatic survey notable for CT with nondisplaced fractures of the left anterolateral 6,7,8,9 ribs. She was incidentally found to have irregular wall thickening of the gastric body. Prior to this admission she denied any ongoing n/v/abdominal pain. Denies any ongoing weight loss, early satiety with no ongoing melena or h-chezia. Pt does note EGD around 2 years ago that was reportedly normal but not sure why it was done. She does take ASA every othre day for CV disease ppx but denies any other AP or AC use. No fam hx of GI malignancies.     Allergies:  No Known Allergies    Home Medications:  ASA every other day   Carvedilol   Omeprazole     Hospital Medications:  lidocaine   4% Patch 1 Patch Transdermal Once    PMHX/PSHX:    HTN   HLD   GERD     Family history:  No family hx of esophogeal gastric or CRC     Social History:   Tob: Denies  EtOH: Denies  Illicit Drugs: Denies    ROS: Complete and normal except as mentioned above    PHYSICAL EXAM:   GENERAL  No acute distress  HEENT:  NCAT, no scleral icterus   CHEST:  no respiratory distress  HEART:  Regular rate and rhythm. +TTP over the left ribs   ABDOMEN:  Soft, non-tender, non-distended, normoactive bowel sounds  EXTREMITIES: No edema  NEURO:  Alert and oriented x 3  PSYCH: normal affect    Vital Signs:  Vital Signs Last 24 Hrs  T(C): 37.9 (06 Feb 2023 10:26), Max: 37.9 (06 Feb 2023 10:26)  T(F): 100.3 (06 Feb 2023 10:26), Max: 100.3 (06 Feb 2023 10:26)  HR: 112 (06 Feb 2023 11:36) (99 - 112)  BP: 98/56 (06 Feb 2023 11:36) (95/63 - 98/56)  BP(mean): --  RR: 16 (06 Feb 2023 11:36) (16 - 18)  SpO2: 98% (06 Feb 2023 11:36) (94% - 98%)    Parameters below as of 06 Feb 2023 11:36  Patient On (Oxygen Delivery Method): room air        LABS:                        13.4   6.96  )-----------( 161      ( 06 Feb 2023 11:39 )             39.3     Mean Cell Volume: 93.1 fl (02-06-23 @ 11:39)    02-06    141  |  107  |  23  ----------------------------<  135<H>  3.7   |  20<L>  |  0.66    Ca    8.4      06 Feb 2023 11:39    TPro  6.7  /  Alb  3.9  /  TBili  0.5  /  DBili  x   /  AST  22  /  ALT  22  /  AlkPhos  47  02-06    LIVER FUNCTIONS - ( 06 Feb 2023 11:39 )  Alb: 3.9 g/dL / Pro: 6.7 g/dL / ALK PHOS: 47 U/L / ALT: 22 U/L / AST: 22 U/L / GGT: x               Lipase serum17                                 13.4   6.96  )-----------( 161      ( 06 Feb 2023 11:39 )             39.3       Imaging:  CT Chest   IMPRESSION:  Nondisplaced fractures of the left anterolateral sixth, seventh, eighth   and ninth ribs. No pneumothorax.    Nonspecific irregular wall thickening of the gastric body. Neoplasm   cannot be excluded. Correlation with endoscopy is suggested.

## 2023-02-06 NOTE — ED ADULT TRIAGE NOTE - CHIEF COMPLAINT QUOTE
Pt BIBA with neck collar in place as per EMS pt was going to the bathroom and her  heard a thump found pt on the fall, pt has a history of vertigo. As per EMS pt unsure if she slipped or became dizzy. Pt takes asa. Pt c/o left side pain and left flank pain

## 2023-02-06 NOTE — H&P ADULT - NSHPPHYSICALEXAM_GEN_ALL_CORE
PHYSICAL EXAM:  General:A&Ox3, resting comfortably  HEENT: Normocephalic, atraumatic, EOMI, PEERLA. CN II-XII intact, nontender thorughout cranium  Neck: Soft, midline trachea, nontender to palpation  throughout spine. ROM intact  Chest: Left chest wall tenderness, IS 1000  Abdomen: Soft, non-distended, non-tender  Groin: Normal appearing  Ext: Palpable radial & DP pulses bilaterally, nontender to palpation throughout all joints, strength 5/5 bilaterally in the upper/lower extremities. No abrasions.   Back: No TTP; no palpable runoff/stepoff/deformity

## 2023-02-06 NOTE — ED ADULT NURSE NOTE - NSFALLRSKPASTHIST_ED_ALL_ED
Bill is having kian problems with insomnia.  I stopped the dexamethasone.  He says he sleeps best on the days he gets th olanzapine and not as well when he is off the olanzapine.  We will see how this cycle goes.  I discussed the insomnia handout with him.   
yes

## 2023-02-06 NOTE — CONSULT NOTE ADULT - ATTENDING COMMENTS
Agree with above. Patient incidentally on CT for trauma survey found to have gastric thickening. She denies any abdominal pain/early satiety/trouble eating. Would workup fevers, pan-culture. Will offer EGD in vs outpatient when acute issues (fever, trauma) has been stabilized.

## 2023-02-06 NOTE — ED PROVIDER NOTE - NS ED ATTENDING STATEMENT MOD
This was a shared visit with the SORIN. I reviewed and verified the documentation and independently performed the documented:

## 2023-02-06 NOTE — ED ADULT TRIAGE NOTE - MEANS OF ARRIVAL
HPI:    Subjective:     Angel Luis Lira, 31 y.o.   at 28w5d presents to L&D with c/o NST for polyhydramnios. Assessment:  Past Medical History:   Diagnosis Date    Heart abnormality     congenial heart defect      History reviewed. No pertinent surgical history. Allergies   Allergen Reactions    Pcn [Penicillins] Hives    Sulfa (Sulfonamide Antibiotics) Hives     Prior to Admission medications    Medication Sig Start Date End Date Taking? Authorizing Provider   prenatal vit-calcium-iron-fa (Prenatal Plus, calcium carb,) 27 mg iron- 1 mg tab Take 1 Tab by mouth daily. Indications: pregnancy   Yes Provider, Historical   ferrous sulfate (Iron) 325 mg (65 mg iron) tablet Take 325 mg by mouth every seven (7) days. Yes Provider, Historical        Objective:     Vitals:  Vitals:    20 1322 20 1324 20 1330   BP: 115/68  118/70   Pulse: 97  (!) 102   Resp: 16     Temp: 98.5 °F (36.9 °C)     Weight:  71.7 kg (158 lb)    Height:  5' 4\" (1.626 m)         Physical Exam:  Patient without distress. Abdomen: soft, nontender  Fundus: soft and non tender  Membranes:  Intact  Fetal Heart Rate: Baseline: 140 per minute  Variability: moderate  Accelerations: 10x10, appropriate for gestational age  Decelerations: variable  Cervical exam: Deferred, no labor complaints      Assessment/Plan:     -Polyhydramnios, single gestation, third trimester  -Large for dates fetus, third trimester  -NST appropriate for gestational age with moderate variability, 10x10 accelerations and occasional variable deceleration noted. Plan: DC home with standard & ER labor precautions. Follow-up in office for routine visit.      Signed By:  Harsh Mitchell CNM     2020 stretcher

## 2023-02-06 NOTE — ED ADULT NURSE REASSESSMENT NOTE - NS ED NURSE REASSESS COMMENT FT1
Pt condition remains stable, awaiting transferred to [place], safety precaution maintained ed observation continues.

## 2023-02-06 NOTE — ED ADULT NURSE NOTE - NSIMPLEMENTINTERV_GEN_ALL_ED
Implemented All Fall Risk Interventions:  [place] to call system. Call bell, personal items and telephone within reach. Instruct patient to call for assistance. Room bathroom lighting operational. Non-slip footwear when patient is off stretcher. Physically safe environment: no spills, clutter or unnecessary equipment. Stretcher in lowest position, wheels locked, appropriate side rails in place. Provide visual cue, wrist band, yellow gown, etc. Monitor gait and stability. Monitor for mental status changes and reorient to person, place, and time. Review medications for side effects contributing to fall risk. Reinforce activity limits and safety measures with patient and family.

## 2023-02-06 NOTE — ED PROVIDER NOTE - CARE PLAN
Principal Discharge DX:	Multiple closed fractures of ribs of left side  Secondary Diagnosis:	Syncope   1

## 2023-02-06 NOTE — ED ADULT NURSE NOTE - CHIEF COMPLAINT QUOTE
Trauma transfer from [place]. L sided rib fractures, unknown AC. Received morphine prior to transfer

## 2023-02-06 NOTE — ED PROVIDER NOTE - CLINICAL SUMMARY MEDICAL DECISION MAKING FREE TEXT BOX
Pt with syncopal episode with fall and left rib tenderness,. Will order diagnostics including CT to r/o intracranial pathology and trauma, provide analgesia.   CT chest: Nondisplaced fractures of the left anterolateral sixth, seventh, eighth   and ninth ribs. No pneumothorax.  Nonspecific irregular wall thickening of the gastric body. Neoplasm   cannot be excluded. Correlation with endoscopy is suggested.  CT brain: No acute intracranial abnormality.  CT C-spine Moderately advanced degenerative changes throughout the spine.  No vertebral fracture, collapse or subluxation.    Given multiple rib fractures case was discussed with [facility] trauma surgeon Dr. Freeman, will accept transfer.   Dr. Jauregui ED attending endorsed.   I had a detailed discussion with the patient and/or guardian regarding the historical points, exam findings, and any diagnostic results supporting the transfer diagnosis. Pt consented to transfer.

## 2023-02-06 NOTE — H&P ADULT - ATTENDING COMMENTS
Pt is a 62 year old female with a medical history signficant for CAD/HTN who presents in tranfer from [facility] after a ground level fall. Work up revealed four left rib fx. Rib score is 1. Pt [id number] on IS.    Primary survey: Intact  Secondary survey: Left chest wall tenderness    PMH/PSH/MEDS/ALL/SH/FH/ROS: Unchanged from H&P  Vitals/PE/Labs/Radiographs: Reviewed    A/p  S/p fall  Four left rib fx  Multimodal pain control  PT evaluation  Discharge planning.

## 2023-02-06 NOTE — ED ADULT TRIAGE NOTE - CHIEF COMPLAINT QUOTE
Trauma transfer from [place]. L sided rib fractures, unknown AC. Received morphine prior to transfer

## 2023-02-06 NOTE — H&P ADULT - NSHPLABSRESULTS_GEN_ALL_CORE
----------  LABORATORY DATA:  ----------                        13.4   6.96  )-----------( 161      ( 06 Feb 2023 11:39 )             39.3               02-06    141  |  107  |  23  ----------------------------<  135<H>  3.7   |  20<L>  |  0.66    Ca    8.4      06 Feb 2023 11:39    TPro  6.7  /  Alb  3.9  /  TBili  0.5  /  DBili  x   /  AST  22  /  ALT  22  /  AlkPhos  47  02-06    LIVER FUNCTIONS - ( 06 Feb 2023 11:39 )  Alb: 3.9 g/dL / Pro: 6.7 g/dL / ALK PHOS: 47 U/L / ALT: 22 U/L / AST: 22 U/L / GGT: x                 < from: CT Chest w/ IV Cont (02.06.23 @ 17:47) >    IMPRESSION:  Nondisplaced fractures of the left anterolateral sixth, seventh and ninth   ribs. Mildly displaced fracture of the left eighth rib.    No other traumatic findings in the chest, abdomen or pelvis.    < end of copied text >

## 2023-02-06 NOTE — ED PROVIDER NOTE - OBJECTIVE STATEMENT
Khmer  #[id number].  Patient states approxi-1 hour prior to ED arrival she felt chills and weakness and fell while walking to the bathroom.  Patient's boyfriend heard her fall and tended to her immediately states patient was initially unresponsive but became responsive to tactile and verbal stimuli.  On arrival patient is complaining mostly of left rib area tenderness.  Patient denies chest pain, or shortness of breath.  Meds: Coreg, aspirin, Lipitor.

## 2023-02-07 ENCOUNTER — TRANSCRIPTION ENCOUNTER (OUTPATIENT)
Age: 63
End: 2023-02-07

## 2023-02-07 PROBLEM — I10 ESSENTIAL (PRIMARY) HYPERTENSION: Chronic | Status: ACTIVE | Noted: 2023-02-06

## 2023-02-07 PROBLEM — E78.5 HYPERLIPIDEMIA, UNSPECIFIED: Chronic | Status: ACTIVE | Noted: 2023-02-06

## 2023-02-07 LAB
ANION GAP SERPL CALC-SCNC: 9 MMOL/L — SIGNIFICANT CHANGE UP (ref 5–17)
BUN SERPL-MCNC: 13 MG/DL — SIGNIFICANT CHANGE UP (ref 7–23)
CALCIUM SERPL-MCNC: 8.4 MG/DL — SIGNIFICANT CHANGE UP (ref 8.4–10.5)
CHLORIDE SERPL-SCNC: 108 MMOL/L — SIGNIFICANT CHANGE UP (ref 96–108)
CO2 SERPL-SCNC: 23 MMOL/L — SIGNIFICANT CHANGE UP (ref 22–31)
CREAT SERPL-MCNC: 0.55 MG/DL — SIGNIFICANT CHANGE UP (ref 0.5–1.3)
CULTURE RESULTS: SIGNIFICANT CHANGE UP
EGFR: 103 ML/MIN/1.73M2 — SIGNIFICANT CHANGE UP
GLUCOSE SERPL-MCNC: 104 MG/DL — HIGH (ref 70–99)
HCT VFR BLD CALC: 36.9 % — SIGNIFICANT CHANGE UP (ref 34.5–45)
HCV AB S/CO SERPL IA: 0.07 S/CO — SIGNIFICANT CHANGE UP (ref 0–0.99)
HCV AB SERPL-IMP: SIGNIFICANT CHANGE UP
HGB BLD-MCNC: 12.5 G/DL — SIGNIFICANT CHANGE UP (ref 11.5–15.5)
MAGNESIUM SERPL-MCNC: 2.1 MG/DL — SIGNIFICANT CHANGE UP (ref 1.6–2.6)
MCHC RBC-ENTMCNC: 31.6 PG — SIGNIFICANT CHANGE UP (ref 27–34)
MCHC RBC-ENTMCNC: 33.9 GM/DL — SIGNIFICANT CHANGE UP (ref 32–36)
MCV RBC AUTO: 93.2 FL — SIGNIFICANT CHANGE UP (ref 80–100)
NRBC # BLD: 0 /100 WBCS — SIGNIFICANT CHANGE UP (ref 0–0)
PHOSPHATE SERPL-MCNC: 2 MG/DL — LOW (ref 2.5–4.5)
PLATELET # BLD AUTO: 141 K/UL — LOW (ref 150–400)
POTASSIUM SERPL-MCNC: 3.6 MMOL/L — SIGNIFICANT CHANGE UP (ref 3.5–5.3)
POTASSIUM SERPL-SCNC: 3.6 MMOL/L — SIGNIFICANT CHANGE UP (ref 3.5–5.3)
RBC # BLD: 3.96 M/UL — SIGNIFICANT CHANGE UP (ref 3.8–5.2)
RBC # FLD: 12.6 % — SIGNIFICANT CHANGE UP (ref 10.3–14.5)
SODIUM SERPL-SCNC: 140 MMOL/L — SIGNIFICANT CHANGE UP (ref 135–145)
SPECIMEN SOURCE: SIGNIFICANT CHANGE UP
WBC # BLD: 5.37 K/UL — SIGNIFICANT CHANGE UP (ref 3.8–10.5)
WBC # FLD AUTO: 5.37 K/UL — SIGNIFICANT CHANGE UP (ref 3.8–10.5)

## 2023-02-07 PROCEDURE — 99232 SBSQ HOSP IP/OBS MODERATE 35: CPT

## 2023-02-07 PROCEDURE — 99231 SBSQ HOSP IP/OBS SF/LOW 25: CPT | Mod: GC

## 2023-02-07 PROCEDURE — 71045 X-RAY EXAM CHEST 1 VIEW: CPT | Mod: 26

## 2023-02-07 RX ORDER — ACETAMINOPHEN 500 MG
3 TABLET ORAL
Qty: 0 | Refills: 0 | DISCHARGE
Start: 2023-02-07

## 2023-02-07 RX ORDER — OXYCODONE HYDROCHLORIDE 5 MG/1
1 TABLET ORAL
Qty: 12 | Refills: 0
Start: 2023-02-07 | End: 2023-02-09

## 2023-02-07 RX ORDER — LIDOCAINE 4 G/100G
1 CREAM TOPICAL
Qty: 30 | Refills: 0
Start: 2023-02-07 | End: 2023-03-08

## 2023-02-07 RX ORDER — SODIUM,POTASSIUM PHOSPHATES 278-250MG
2 POWDER IN PACKET (EA) ORAL ONCE
Refills: 0 | Status: COMPLETED | OUTPATIENT
Start: 2023-02-07 | End: 2023-02-07

## 2023-02-07 RX ORDER — OMEPRAZOLE 10 MG/1
1 CAPSULE, DELAYED RELEASE ORAL
Qty: 0 | Refills: 0 | DISCHARGE

## 2023-02-07 RX ORDER — CARVEDILOL PHOSPHATE 80 MG/1
1 CAPSULE, EXTENDED RELEASE ORAL
Qty: 0 | Refills: 0 | DISCHARGE

## 2023-02-07 RX ORDER — POLYETHYLENE GLYCOL 3350 17 G/17G
17 POWDER, FOR SOLUTION ORAL
Qty: 0 | Refills: 0 | DISCHARGE
Start: 2023-02-07

## 2023-02-07 RX ORDER — SENNA PLUS 8.6 MG/1
2 TABLET ORAL
Qty: 0 | Refills: 0 | DISCHARGE
Start: 2023-02-07

## 2023-02-07 RX ADMIN — LIDOCAINE 1 PATCH: 4 CREAM TOPICAL at 19:42

## 2023-02-07 RX ADMIN — OXYCODONE HYDROCHLORIDE 5 MILLIGRAM(S): 5 TABLET ORAL at 10:26

## 2023-02-07 RX ADMIN — Medication 975 MILLIGRAM(S): at 11:16

## 2023-02-07 RX ADMIN — SENNA PLUS 2 TABLET(S): 8.6 TABLET ORAL at 21:56

## 2023-02-07 RX ADMIN — LIDOCAINE 1 PATCH: 4 CREAM TOPICAL at 11:15

## 2023-02-07 RX ADMIN — PANTOPRAZOLE SODIUM 40 MILLIGRAM(S): 20 TABLET, DELAYED RELEASE ORAL at 05:46

## 2023-02-07 RX ADMIN — OXYCODONE HYDROCHLORIDE 5 MILLIGRAM(S): 5 TABLET ORAL at 18:12

## 2023-02-07 RX ADMIN — Medication 2 TABLET(S): at 17:13

## 2023-02-07 RX ADMIN — OXYCODONE HYDROCHLORIDE 5 MILLIGRAM(S): 5 TABLET ORAL at 11:26

## 2023-02-07 RX ADMIN — Medication 975 MILLIGRAM(S): at 11:47

## 2023-02-07 RX ADMIN — OXYCODONE HYDROCHLORIDE 5 MILLIGRAM(S): 5 TABLET ORAL at 00:16

## 2023-02-07 RX ADMIN — POLYETHYLENE GLYCOL 3350 17 GRAM(S): 17 POWDER, FOR SOLUTION ORAL at 11:18

## 2023-02-07 RX ADMIN — CARVEDILOL PHOSPHATE 6.25 MILLIGRAM(S): 80 CAPSULE, EXTENDED RELEASE ORAL at 17:13

## 2023-02-07 RX ADMIN — Medication 975 MILLIGRAM(S): at 06:21

## 2023-02-07 RX ADMIN — LIDOCAINE 1 PATCH: 4 CREAM TOPICAL at 23:00

## 2023-02-07 RX ADMIN — Medication 975 MILLIGRAM(S): at 05:46

## 2023-02-07 RX ADMIN — Medication 975 MILLIGRAM(S): at 23:58

## 2023-02-07 RX ADMIN — Medication 5 MILLIGRAM(S): at 21:56

## 2023-02-07 RX ADMIN — ENOXAPARIN SODIUM 40 MILLIGRAM(S): 100 INJECTION SUBCUTANEOUS at 17:12

## 2023-02-07 RX ADMIN — OXYCODONE HYDROCHLORIDE 5 MILLIGRAM(S): 5 TABLET ORAL at 17:12

## 2023-02-07 RX ADMIN — Medication 975 MILLIGRAM(S): at 01:16

## 2023-02-07 NOTE — PHYSICAL THERAPY INITIAL EVALUATION ADULT - PLANNED THERAPY INTERVENTIONS, PT EVAL
stair neg:GOAL: pt will neg 10 steps ind in 4wks./bed mobility training/gait training/transfer training

## 2023-02-07 NOTE — PHYSICAL THERAPY INITIAL EVALUATION ADULT - HEALTH SCREEN CRITERIA
yes
stated
PAST MEDICAL HISTORY:  GERD (gastroesophageal reflux disease)     Hypercholesterolemia     Hypothyroidism     OA (osteoarthritis)     BLAINE on CPAP

## 2023-02-07 NOTE — PROGRESS NOTE ADULT - SUBJECTIVE AND OBJECTIVE BOX
Gastroenterology Progress Note    Interval Events:   Continues to have pain over left ribs. Otherwise, tolerating a diet with no ongoing n/v/abdominal pain.     Allergies:  Medrol Dosepak (Short breath)  Paxil (Flushing; Drowsiness)  rifampin (Hives; Rash)      Hospital Medications:  acetaminophen     Tablet .. 975 milliGRAM(s) Oral every 6 hours  carvedilol 6.25 milliGRAM(s) Oral every 12 hours  enoxaparin Injectable 40 milliGRAM(s) SubCutaneous every 24 hours  lidocaine   4% Patch 1 Patch Transdermal daily  oxyCODONE    IR 5 milliGRAM(s) Oral every 6 hours PRN  oxyCODONE    IR 2.5 milliGRAM(s) Oral every 6 hours PRN  pantoprazole    Tablet 40 milliGRAM(s) Oral before breakfast  polyethylene glycol 3350 17 Gram(s) Oral every 24 hours  senna 2 Tablet(s) Oral at bedtime      ROS: 14 point ROS negative unless otherwise state in subjective    PHYSICAL EXAM:   Vital Signs:  Vital Signs Last 24 Hrs  T(C): 36.9 (07 Feb 2023 05:09), Max: 37.9 (06 Feb 2023 10:26)  T(F): 98.4 (07 Feb 2023 05:09), Max: 100.3 (06 Feb 2023 10:26)  HR: 90 (07 Feb 2023 05:09) (86 - 112)  BP: 93/59 (07 Feb 2023 05:09) (93/58 - 105/69)  BP(mean): 81 (06 Feb 2023 17:45) (81 - 81)  RR: 18 (07 Feb 2023 05:09) (16 - 18)  SpO2: 95% (07 Feb 2023 05:09) (94% - 99%)    Parameters below as of 07 Feb 2023 05:09  Patient On (Oxygen Delivery Method): room air        GENERAL:  No acute distress  HEENT:  NCAT, no scleral icterus  CHEST: no resp distress  HEART:  RRR  ABDOMEN:  Soft, non-tender, non-distended, normoactive bowel sounds. +TTP over the left ribs.   EXTREMITIES:  No  edema  NEURO:  Alert and oriented x 3    LABS:                        13.4   6.96  )-----------( 161      ( 06 Feb 2023 11:39 )             39.3     Mean Cell Volume: 93.1 fl (02-06-23 @ 11:39)    02-06    141  |  107  |  23  ----------------------------<  135<H>  3.7   |  20<L>  |  0.66    Ca    8.4      06 Feb 2023 11:39    TPro  6.7  /  Alb  3.9  /  TBili  0.5  /  DBili  x   /  AST  22  /  ALT  22  /  AlkPhos  47  02-06    LIVER FUNCTIONS - ( 06 Feb 2023 11:39 )  Alb: 3.9 g/dL / Pro: 6.7 g/dL / ALK PHOS: 47 U/L / ALT: 22 U/L / AST: 22 U/L / GGT: x             Lipase serum17      Imaging:  CT Chest   IMPRESSION:  Nondisplaced fractures of the left anterolateral sixth, seventh, eighth   and ninth ribs. No pneumothorax.    Nonspecific irregular wall thickening of the gastric body. Neoplasm   cannot be excluded. Correlation with endoscopy is suggested.

## 2023-02-07 NOTE — DISCHARGE NOTE PROVIDER - HOSPITAL COURSE
62F pmh acid reflux, HTN presenting as transfer from [facility] after ground level fall at 1am on 2/6. Patient reports she fell after getting up from bed to go to the bathroom, feels that the melatonin she took made her dizzy which she reports has happened in the past, however denies previous falls. Patient admitted to trauma surgery and full workup initiated. Patient found to have Left 6-9 anterior rib fx, and Incidentally found gastric wall thickening. Pain was managed during stay and GI consulted for gastric wall thickening. There was no acute gi intervention indicated and outpatient follow up was suggested. Patient's diet advanced as tolerated. PT performed an eval during stay. On day of discharge, the patient was tolerating diet, ambulating well and pain controlled. All questions were answered and patient safely discharged home. 62F pmh acid reflux, HTN presenting as transfer from [facility] after ground level fall at 1am on 2/6. Patient reports she fell after getting up from bed to go to the bathroom, feels that the melatonin she took made her dizzy which she reports has happened in the past, however denies previous falls. Patient admitted to trauma surgery and full workup initiated. Patient found to have Left 6-9 anterior rib fx, and Incidentally found gastric wall thickening. Pain was managed during stay and GI consulted for gastric wall thickening. There was no acute gi intervention indicated and outpatient follow up was suggested. Patient's diet advanced as tolerated. PT performed an eval during stay. On day of discharge, the patient was tolerating diet and pain controlled. All questions were answered and patient safely discharged home.

## 2023-02-07 NOTE — PHYSICAL THERAPY INITIAL EVALUATION ADULT - ACTIVE RANGE OF MOTION EXAMINATION, REHAB EVAL
stiffness to LLE, hx L TKR 3 yrs ago; RLE WFL, RUE WFL, L shld flex decr 2* pain/Left UE Active ROM was WFL (within functional limits)/Right UE Active ROM was WFL (within functional limits)/Left LE Active ROM was WFL (within functional limits)/Right LE Active ROM was WFL (within functional limits)

## 2023-02-07 NOTE — DISCHARGE NOTE PROVIDER - CARE PROVIDER_API CALL
Ryan Kohli)  Internal Medicine  [address] [place] [address], [address] [place] [address]  Phone: [phone]  Fax: [phone]  Follow Up Time: 1 week

## 2023-02-07 NOTE — PHYSICAL THERAPY INITIAL EVALUATION ADULT - NSPTDMEREC_GEN_A_CORE
recommend rolling walker for pain control, long distances and decreased strength for DC home/rolling walker

## 2023-02-07 NOTE — PROGRESS NOTE ADULT - ASSESSMENT
62F presenting after ground level fall with the following injuries, rib score 1, IS 1000:    - Left 6-9 anterior rib fx  - Incidentally found gastric wall thickening    Plan:  - Rib protocol management, avoiding NSAIDS per GI recs  - Home carvedilol resumed  - Appreciate GI recs for incidental gastric thickening. Will offer EGD in vs outpatient when acute issues (fever, trauma) has been stabilized.   As of 2/7 no fevers over night noted. x1 temp of 100.3 2/6 10AM  - Diet: Regular diet  - Bowel regimen  - Lovenox ppx  - Am labs  - Tertiary this AM  - Dispo: PT eval this AM    ATP  [id number]

## 2023-02-07 NOTE — PROGRESS NOTE ADULT - SUBJECTIVE AND OBJECTIVE BOX
TRAUMA SURGERY DAILY PROGRESS NOTE:     SUBJECTIVE/ROS: Patient seen and examined in ED. Noted to be resting comfortably- reports pain is well controlled.    Denies nausea, vomiting, chest pain, shortness of breath.      MEDICATIONS  (STANDING):  acetaminophen     Tablet .. 975 milliGRAM(s) Oral every 6 hours  carvedilol 6.25 milliGRAM(s) Oral every 12 hours  enoxaparin Injectable 40 milliGRAM(s) SubCutaneous every 24 hours  lidocaine   4% Patch 1 Patch Transdermal daily  pantoprazole    Tablet 40 milliGRAM(s) Oral before breakfast  polyethylene glycol 3350 17 Gram(s) Oral every 24 hours  senna 2 Tablet(s) Oral at bedtime    MEDICATIONS  (PRN):  oxyCODONE    IR 5 milliGRAM(s) Oral every 6 hours PRN Severe Pain (7 - 10)  oxyCODONE    IR 2.5 milliGRAM(s) Oral every 6 hours PRN Moderate Pain (4 - 6)    OBJECTIVE:  Vital Signs Last 24 Hrs  T(C): 36.9 (07 Feb 2023 05:09), Max: 37.9 (06 Feb 2023 10:26)  T(F): 98.4 (07 Feb 2023 05:09), Max: 100.3 (06 Feb 2023 10:26)  HR: 90 (07 Feb 2023 05:09) (86 - 112)  BP: 93/59 (07 Feb 2023 05:09) (93/58 - 105/69)  BP(mean): 81 (06 Feb 2023 17:45) (81 - 81)  RR: 18 (07 Feb 2023 05:09) (16 - 18)  SpO2: 95% (07 Feb 2023 05:09) (94% - 99%)    Parameters below as of 07 Feb 2023 05:09  Patient On (Oxygen Delivery Method): room air    I&O's Detail    LABS:                        13.4   6.96  )-----------( 161      ( 06 Feb 2023 11:39 )             39.3     02-06    141  |  107  |  23  ----------------------------<  135<H>  3.7   |  20<L>  |  0.66    Ca    8.4      06 Feb 2023 11:39    TPro  6.7  /  Alb  3.9  /  TBili  0.5  /  DBili  x   /  AST  22  /  ALT  22  /  AlkPhos  47  02-06

## 2023-02-07 NOTE — PHYSICAL THERAPY INITIAL EVALUATION ADULT - NSPTDISCHREC_GEN_A_CORE
DC working towards home with HPT, recommend rolling walker for pain control, long distances and decreased strength for DC home, owns shower chair, +walk in shower, assist from significant other for mobility/ADLs, CM to be notified

## 2023-02-07 NOTE — DISCHARGE NOTE PROVIDER - NSDCCPCAREPLAN_GEN_ALL_CORE_FT
PRINCIPAL DISCHARGE DIAGNOSIS  Diagnosis: Fracture of four ribs of left side  Assessment and Plan of Treatment: ACTIVITY: No heavy lifting anything more than 10-15lbs or straining. Otherwise, you may return to your usual level of physical activity. If you are taking narcotic pain medication (such as Oxycodone), do NOT drive a car, operate machinery or make important decisions.  PAIN: Continue to take 1000mg ov tylenol every 6 hours. you have been prescribed Oxyodone 5mg for extreme pain only.   You may also use a lidoderm patch daily over your rib area. Please use as directed.   NOTIFY YOUR SURGEON IF: You have shortness of breath, chest pain, trouble walking,  or if your pain is not controlled on your discharge pain medications.  FOLLOW-UP:  Please follow up with your PCP in one week regarding your hospitalization.      SECONDARY DISCHARGE DIAGNOSES  Diagnosis: Gastric wall thickening  Assessment and Plan of Treatment:      PRINCIPAL DISCHARGE DIAGNOSIS  Diagnosis: Fracture of four ribs of left side  Assessment and Plan of Treatment: ACTIVITY: No heavy lifting anything more than 10-15lbs or straining. Otherwise, you may return to your usual level of physical activity. If you are taking narcotic pain medication (such as Oxycodone), do NOT drive a car, operate machinery or make important decisions.  PAIN: Continue to take 1000mg ov tylenol every 6 hours. you have been prescribed Oxyodone 5mg for extreme pain only.   You may also use a lidoderm patch daily over your rib area. Please use as directed.   NOTIFY YOUR SURGEON IF: You have shortness of breath, chest pain, trouble walking,  or if your pain is not controlled on your discharge pain medications.  FOLLOW-UP:  Please follow up with your PCP in one week regarding your hospitalization.      SECONDARY DISCHARGE DIAGNOSES  Diagnosis: Gastric wall thickening  Assessment and Plan of Treatment: Please  follow up with your gastroenterologist Dr.Snow Arechiga.  Please follow up if you develop, any fever (over 100.4 F) or chills, persistent nausea/vomiting with inability to tolerate food or liquids, persistent diarrhea, or if your pain is not controlled on your discharge pain medications.

## 2023-02-07 NOTE — DISCHARGE NOTE PROVIDER - NSFOLLOWUPCLINICS_GEN_ALL_ED_FT
Gastroenterology at [facility]  Gastroenterology  [address] [place] [id number]  Phone: [phone]  Fax:   Follow Up Time: 2 weeks

## 2023-02-07 NOTE — PHYSICAL THERAPY INITIAL EVALUATION ADULT - ADDITIONAL COMMENTS
Pt lives in  with significant other (boyfriend), +several steps outside with railing, +Flight of stairs in home with railing to bedroom, PTA ind amb and ADLs no device, +walk in shower with shower chair, +boyfriend drives

## 2023-02-07 NOTE — PHYSICAL THERAPY INITIAL EVALUATION ADULT - GENERAL OBSERVATIONS, REHAB EVAL
Pt a/w s/p fall, +L anterior 6-9 rib fractures, found gastric wall thickening. Pt received in ED on stretcher, +IVL, c/o pain but tolerable.

## 2023-02-07 NOTE — PHYSICAL THERAPY INITIAL EVALUATION ADULT - PERTINENT HX OF CURRENT PROBLEM, REHAB EVAL
Pt is a 62F admitted to [facility] on 2/6/23 pmh acid reflux, HTN presenting as transfer from [facility] after ground level fall at 1am on 2/6. Pt reports she fell after getting up from bed to go to the bathroom, feels that the melatonin she took made her dizzy which she reports has happened in the past, however denies previous falls. No HS, No LOC. Called for boyfriend to help her up. Hospital course: On arrival, primary intact, secondary significant for left chest wall tenderness. She went to  with traumatic survey notable for CT with nondisplaced fractures of the left anterolateral 6,7,8,9 ribs. She was incidentally found to have irregular wall thickening of the gastric body. Prior to this admission she denied any ongoing n/v/abdominal pain. Denies any ongoing weight loss, early satiety with no ongoing melena or h-chezia. Pt does note EGD around 2 years ago that was reportedly normal but not sure why it was done. She does take ASA every othre day for CV disease ppx but denies any other AP or AC use. No fam hx of GI malignancies. CT abd/pelvis/chest: Nondisplaced fractures of the left anterolateral sixth, seventh and ninth ribs. Mildly displaced fracture of the left eighth rib. No other traumatic findings in the chest, abdomen or pelvis.

## 2023-02-07 NOTE — PROGRESS NOTE ADULT - ASSESSMENT
Juan Pablo Bowie is a 62 year old female with a PMHx of HTN, HLD and GERD presenting as a transfer from [facility] after fall with LOC s/p multiple left rib fractures with hospital course c/b new onset fevers. GI consulted for consideration of EGD for gastric wall thickening noted on CT.    #Gastric wall thickening noted on CT - ddx includes gastritis, PUD, gastric neoplasms  #Fever  #Syncope with LOC   Incidentally found to have nonspecific irregular wall thickening of the gastric body on CT chest obtained for traumatic workup as outlined above. Pt overall without any ongoing constitutional symptoms including early satiety, weight loss, n/v/abdominal pain with no ongoing melena/h-cehzia. Last EGD around 2 years ago with Dr. Gabriela Arechiga with mild esophagitis and bx with H. Pylori treated with a Quad therapy. Risk factors of PUD including ongoing ASA use. Overall,  irregular wall thickening of the gastric body relatively non-specific. Can plan for endoscopic evaluation further workup of the above  once more medically optimized  from surgical perspective re: new left sided fractures, infectious workup and cardiac workup given syncope    Recommendation:  -Diet as tolerated   -Recommend fever workup - pan-culture  -Can plan for endoscopic evaluation further workup of the above  once more medically optimized re: fractures, fevers and cardiac workiup    -PO PPI QD   -Avoid NSAIDs     All recommendations are tentative until note is attested by attending.     Leodan Melo, PGY-4  Gastroenterology/Hepatology Fellow  Available on [employer] Teams   [phone] (Long Range Pager)  [phone] (Short Range Pager [facility])    After 5pm, please contact the on-call GI fellow. [phone]   Juan Pablo Bowie is a 62 year old female with a PMHx of HTN, HLD and GERD presenting as a transfer from [facility] after fall with LOC s/p multiple left rib fractures with hospital course c/b new onset fevers. GI consulted for consideration of EGD for gastric wall thickening noted on CT.    #Gastric wall thickening noted on CT - ddx includes gastritis, PUD, gastric neoplasms  #Fever  #Syncope with LOC   Incidentally found to have nonspecific irregular wall thickening of the gastric body on CT chest obtained for traumatic workup as outlined above. Pt overall without any ongoing constitutional symptoms including early satiety, weight loss, n/v/abdominal pain with no ongoing melena/h-cehzia. Last EGD around 2 years ago with Dr. Gabriela Arechiga with mild esophagitis and bx with H. Pylori treated with a Quad therapy. Risk factors of PUD including ongoing ASA use. Overall,  irregular wall thickening of the gastric body relatively non-specific. Can plan for endoscopic evaluation further workup of the above  once more medically optimized  from surgical perspective re: new left sided fractures, infectious workup and cardiac workup given syncope    Recommendation:  -Diet as tolerated   -Recommend fever workup - pan-culture  -Can plan for non-urgent inpatient endoscopic evaluation further workup of the above once more medically optimized re: fractures, fevers and cardiac workup or alternatively have this done as outpatient with primary GI Dr. Gabriela Arechiga   -PO PPI QD   -Avoid NSAIDs     All recommendations are tentative until note is attested by attending.     Leodan Melo, PGY-4  Gastroenterology/Hepatology Fellow  Available on [employer] Teams   [phone] (Long Range Pager)  [phone] (Short Range Pager [facility])    After 5pm, please contact the on-call GI fellow. [phone]      All recommendations are tentative until note is attested by attending.     Leodan Melo, PGY-4  Gastroenterology/Hepatology Fellow  Available on [employer] Teams   [phone] (Long Range Pager)  [phone] (Short Range Pager [facility])    After 5pm, please contact the on-call GI fellow. [phone]

## 2023-02-07 NOTE — DISCHARGE NOTE PROVIDER - NSDCFUADDAPPT_GEN_ALL_CORE_FT
Please make an apt with Dr.Snow Arechiga.  Please make an apt with Dr. Ryan Kohli, your PCP for follow up

## 2023-02-07 NOTE — DISCHARGE NOTE PROVIDER - NSDCMRMEDTOKEN_GEN_ALL_CORE_FT
acetaminophen 325 mg oral tablet: 3 tab(s) orally every 6 hours  carvedilol 6.25 mg oral tablet: 1 tab(s) orally 2 times a day  glucosamine 500 mg oral tablet: 1 tab(s) orally 3 times a day  lidocaine 4% topical film: Apply topically to affected area once a day   omeprazole 40 mg oral delayed release capsule: 1 cap(s) orally once a day  oxyCODONE 5 mg oral tablet: 1 tab(s) orally every 6 hours, As needed, Severe Pain (7 - 10) MDD:4  polyethylene glycol 3350 oral powder for reconstitution: 17 gram(s) orally every 24 hours  Luis Oh Dx: rib fx s/p fall:   senna leaf extract oral tablet: 2 tab(s) orally once a day (at bedtime)  Vitamin B-100 oral tablet: 1 tab(s) orally once a day   acetaminophen 325 mg oral tablet: 3 tab(s) orally every 6 hours  carvedilol 6.25 mg oral tablet: 1 tab(s) orally 2 times a day  glucosamine 500 mg oral tablet: 1 tab(s) orally 3 times a day  home physical therapy :   lidocaine 4% topical film: Apply topically to affected area once a day   omeprazole 40 mg oral delayed release capsule: 1 cap(s) orally once a day  oxyCODONE 5 mg oral tablet: 1 tab(s) orally every 6 hours, As needed, Severe Pain (7 - 10) MDD:4  polyethylene glycol 3350 oral powder for reconstitution: 17 gram(s) orally every 24 hours  Luis Oh Dx: rib fx s/p fall:   senna leaf extract oral tablet: 2 tab(s) orally once a day (at bedtime)  Vitamin B-100 oral tablet: 1 tab(s) orally once a day

## 2023-02-07 NOTE — PATIENT PROFILE ADULT - HOME ACCESSIBILITY CONCERNS
Luz Tyler arrives to ER via triage from urgent care for CC of rectal bleeding. Sent in from urgent care for guiac positive blood and rectal bleeding. Hx of dementia. Daughter present to assist with history.   none

## 2023-02-07 NOTE — PATIENT PROFILE ADULT - FALL HARM RISK - HARM RISK INTERVENTIONS
Assistance with ambulation/Assistance OOB with selected safe patient handling equipment/Communicate Risk of Fall with Harm to all staff/Discuss with provider need for PT consult/Monitor gait and stability/Provide patient with walking aids - walker, cane, crutches/Reinforce activity limits and safety measures with patient and family/Sit up slowly, dangle for a short time, stand at bedside before walking/Tailored Fall Risk Interventions/Visual Cue: Yellow wristband and red socks/Bed in lowest position, wheels locked, appropriate side rails in place/Call bell, personal items and telephone in reach/Instruct patient to call for assistance before getting out of bed or chair/Non-slip footwear when patient is out of bed/[place] to call system/Physically safe environment - no spills, clutter or unnecessary equipment/Purposeful Proactive Rounding/Room/bathroom lighting operational, light cord in reach

## 2023-02-08 ENCOUNTER — TRANSCRIPTION ENCOUNTER (OUTPATIENT)
Age: 63
End: 2023-02-08

## 2023-02-08 LAB
ANION GAP SERPL CALC-SCNC: 7 MMOL/L — SIGNIFICANT CHANGE UP (ref 5–17)
BUN SERPL-MCNC: 9 MG/DL — SIGNIFICANT CHANGE UP (ref 7–23)
CALCIUM SERPL-MCNC: 8.4 MG/DL — SIGNIFICANT CHANGE UP (ref 8.4–10.5)
CHLORIDE SERPL-SCNC: 108 MMOL/L — SIGNIFICANT CHANGE UP (ref 96–108)
CO2 SERPL-SCNC: 25 MMOL/L — SIGNIFICANT CHANGE UP (ref 22–31)
CREAT SERPL-MCNC: 0.51 MG/DL — SIGNIFICANT CHANGE UP (ref 0.5–1.3)
EGFR: 105 ML/MIN/1.73M2 — SIGNIFICANT CHANGE UP
GLUCOSE SERPL-MCNC: 112 MG/DL — HIGH (ref 70–99)
HCT VFR BLD CALC: 36.6 % — SIGNIFICANT CHANGE UP (ref 34.5–45)
HGB BLD-MCNC: 12.7 G/DL — SIGNIFICANT CHANGE UP (ref 11.5–15.5)
MAGNESIUM SERPL-MCNC: 2.1 MG/DL — SIGNIFICANT CHANGE UP (ref 1.6–2.6)
MCHC RBC-ENTMCNC: 31.7 PG — SIGNIFICANT CHANGE UP (ref 27–34)
MCHC RBC-ENTMCNC: 34.7 GM/DL — SIGNIFICANT CHANGE UP (ref 32–36)
MCV RBC AUTO: 91.3 FL — SIGNIFICANT CHANGE UP (ref 80–100)
NRBC # BLD: 0 /100 WBCS — SIGNIFICANT CHANGE UP (ref 0–0)
PHOSPHATE SERPL-MCNC: 2.5 MG/DL — SIGNIFICANT CHANGE UP (ref 2.5–4.5)
PLATELET # BLD AUTO: 126 K/UL — LOW (ref 150–400)
POTASSIUM SERPL-MCNC: 3.3 MMOL/L — LOW (ref 3.5–5.3)
POTASSIUM SERPL-SCNC: 3.3 MMOL/L — LOW (ref 3.5–5.3)
RBC # BLD: 4.01 M/UL — SIGNIFICANT CHANGE UP (ref 3.8–5.2)
RBC # FLD: 12.3 % — SIGNIFICANT CHANGE UP (ref 10.3–14.5)
SODIUM SERPL-SCNC: 140 MMOL/L — SIGNIFICANT CHANGE UP (ref 135–145)
WBC # BLD: 5.3 K/UL — SIGNIFICANT CHANGE UP (ref 3.8–10.5)
WBC # FLD AUTO: 5.3 K/UL — SIGNIFICANT CHANGE UP (ref 3.8–10.5)

## 2023-02-08 PROCEDURE — 99231 SBSQ HOSP IP/OBS SF/LOW 25: CPT | Mod: GC

## 2023-02-08 PROCEDURE — 99232 SBSQ HOSP IP/OBS MODERATE 35: CPT

## 2023-02-08 RX ORDER — POTASSIUM CHLORIDE 20 MEQ
40 PACKET (EA) ORAL EVERY 4 HOURS
Refills: 0 | Status: COMPLETED | OUTPATIENT
Start: 2023-02-08 | End: 2023-02-08

## 2023-02-08 RX ORDER — TRAMADOL HYDROCHLORIDE 50 MG/1
50 TABLET ORAL EVERY 4 HOURS
Refills: 0 | Status: DISCONTINUED | OUTPATIENT
Start: 2023-02-08 | End: 2023-02-09

## 2023-02-08 RX ORDER — TRAMADOL HYDROCHLORIDE 50 MG/1
25 TABLET ORAL EVERY 4 HOURS
Refills: 0 | Status: DISCONTINUED | OUTPATIENT
Start: 2023-02-08 | End: 2023-02-09

## 2023-02-08 RX ORDER — LANOLIN ALCOHOL/MO/W.PET/CERES
5 CREAM (GRAM) TOPICAL ONCE
Refills: 0 | Status: COMPLETED | OUTPATIENT
Start: 2023-02-08 | End: 2023-02-09

## 2023-02-08 RX ORDER — SODIUM,POTASSIUM PHOSPHATES 278-250MG
1 POWDER IN PACKET (EA) ORAL ONCE
Refills: 0 | Status: COMPLETED | OUTPATIENT
Start: 2023-02-08 | End: 2023-02-08

## 2023-02-08 RX ADMIN — TRAMADOL HYDROCHLORIDE 50 MILLIGRAM(S): 50 TABLET ORAL at 11:04

## 2023-02-08 RX ADMIN — ENOXAPARIN SODIUM 40 MILLIGRAM(S): 100 INJECTION SUBCUTANEOUS at 17:35

## 2023-02-08 RX ADMIN — LIDOCAINE 1 PATCH: 4 CREAM TOPICAL at 23:00

## 2023-02-08 RX ADMIN — Medication 975 MILLIGRAM(S): at 18:35

## 2023-02-08 RX ADMIN — Medication 975 MILLIGRAM(S): at 17:35

## 2023-02-08 RX ADMIN — Medication 975 MILLIGRAM(S): at 12:05

## 2023-02-08 RX ADMIN — TRAMADOL HYDROCHLORIDE 50 MILLIGRAM(S): 50 TABLET ORAL at 12:05

## 2023-02-08 RX ADMIN — Medication 975 MILLIGRAM(S): at 07:00

## 2023-02-08 RX ADMIN — LIDOCAINE 1 PATCH: 4 CREAM TOPICAL at 11:05

## 2023-02-08 RX ADMIN — Medication 40 MILLIEQUIVALENT(S): at 11:04

## 2023-02-08 RX ADMIN — Medication 1 PACKET(S): at 11:04

## 2023-02-08 RX ADMIN — CARVEDILOL PHOSPHATE 6.25 MILLIGRAM(S): 80 CAPSULE, EXTENDED RELEASE ORAL at 17:35

## 2023-02-08 RX ADMIN — Medication 975 MILLIGRAM(S): at 06:00

## 2023-02-08 RX ADMIN — Medication 975 MILLIGRAM(S): at 11:05

## 2023-02-08 RX ADMIN — PANTOPRAZOLE SODIUM 40 MILLIGRAM(S): 20 TABLET, DELAYED RELEASE ORAL at 06:01

## 2023-02-08 RX ADMIN — POLYETHYLENE GLYCOL 3350 17 GRAM(S): 17 POWDER, FOR SOLUTION ORAL at 11:05

## 2023-02-08 RX ADMIN — Medication 40 MILLIEQUIVALENT(S): at 17:42

## 2023-02-08 NOTE — PROGRESS NOTE ADULT - SUBJECTIVE AND OBJECTIVE BOX
Gastroenterology Progress Note    Interval Events:   Continues to complain of pain     Allergies:  Medrol (Unknown)  Medrol Dosepak (Short breath)  Paxil (Flushing; Drowsiness)  Paxil (Unknown)  rifampin (Hives; Rash)  rifampin (Unknown)      Hospital Medications:  acetaminophen     Tablet .. 975 milliGRAM(s) Oral every 6 hours  bisacodyl 5 milliGRAM(s) Oral at bedtime  carvedilol 6.25 milliGRAM(s) Oral every 12 hours  enoxaparin Injectable 40 milliGRAM(s) SubCutaneous every 24 hours  lidocaine   4% Patch 1 Patch Transdermal daily  oxyCODONE    IR 2.5 milliGRAM(s) Oral every 6 hours PRN  oxyCODONE    IR 5 milliGRAM(s) Oral every 6 hours PRN  pantoprazole    Tablet 40 milliGRAM(s) Oral before breakfast  polyethylene glycol 3350 17 Gram(s) Oral every 24 hours  potassium chloride    Tablet ER 40 milliEquivalent(s) Oral every 4 hours  potassium phosphate / sodium phosphate Powder (PHOS-NaK) 1 Packet(s) Oral once  senna 2 Tablet(s) Oral at bedtime      ROS: 14 point ROS negative unless otherwise state in subjective    PHYSICAL EXAM:   Vital Signs:  Vital Signs Last 24 Hrs  T(C): 36.8 (08 Feb 2023 08:45), Max: 37.9 (07 Feb 2023 11:45)  T(F): 98.2 (08 Feb 2023 08:45), Max: 100.2 (07 Feb 2023 11:45)  HR: 89 (08 Feb 2023 08:45) (89 - 106)  BP: 100/66 (08 Feb 2023 08:45) (100/66 - 136/87)  BP(mean): --  RR: 18 (08 Feb 2023 08:45) (16 - 19)  SpO2: 96% (08 Feb 2023 08:45) (94% - 96%)    Parameters below as of 08 Feb 2023 04:40  Patient On (Oxygen Delivery Method): room air      Daily Height in cm: 149.86 (07 Feb 2023 18:14)    Daily     GENERAL:  No acute distress  HEENT:  NCAT, no scleral icterus  CHEST: no resp distress  HEART:  RRR  ABDOMEN:  Soft, non-tender, non-distended, normoactive bowel sounds, no masses  EXTREMITIES:  No cyanosis, clubbing, or edema  SKIN:  No rash/erythema/ecchymoses/petechiae/wounds/abscess/warm/dry  NEURO:  Alert and oriented x 3, no asterixis, no tremor    LABS:                        12.7   5.30  )-----------( 126      ( 08 Feb 2023 07:17 )             36.6     Mean Cell Volume: 91.3 fl (02-08-23 @ 07:17)    02-08    140  |  108  |  9   ----------------------------<  112<H>  3.3<L>   |  25  |  0.51    Ca    8.4      08 Feb 2023 07:16  Phos  2.5     02-08  Mg     2.1     02-08    TPro  6.7  /  Alb  3.9  /  TBili  0.5  /  DBili  x   /  AST  22  /  ALT  22  /  AlkPhos  47  02-06    LIVER FUNCTIONS - ( 06 Feb 2023 11:39 )  Alb: 3.9 g/dL / Pro: 6.7 g/dL / ALK PHOS: 47 U/L / ALT: 22 U/L / AST: 22 U/L / GGT: x                     Imaging:           Gastroenterology Progress Note    Interval Events:   Continues to complain of ongoing left rib pain. Denies any ongoing n/v/abd pain.    Allergies:  Medrol (Unknown)  Medrol Dosepak (Short breath)  Paxil (Flushing; Drowsiness)  Paxil (Unknown)  rifampin (Hives; Rash)  rifampin (Unknown)      Hospital Medications:  acetaminophen     Tablet .. 975 milliGRAM(s) Oral every 6 hours  bisacodyl 5 milliGRAM(s) Oral at bedtime  carvedilol 6.25 milliGRAM(s) Oral every 12 hours  enoxaparin Injectable 40 milliGRAM(s) SubCutaneous every 24 hours  lidocaine   4% Patch 1 Patch Transdermal daily  oxyCODONE    IR 2.5 milliGRAM(s) Oral every 6 hours PRN  oxyCODONE    IR 5 milliGRAM(s) Oral every 6 hours PRN  pantoprazole    Tablet 40 milliGRAM(s) Oral before breakfast  polyethylene glycol 3350 17 Gram(s) Oral every 24 hours  potassium chloride    Tablet ER 40 milliEquivalent(s) Oral every 4 hours  potassium phosphate / sodium phosphate Powder (PHOS-NaK) 1 Packet(s) Oral once  senna 2 Tablet(s) Oral at bedtime      ROS: 14 point ROS negative unless otherwise state in subjective    PHYSICAL EXAM:   Vital Signs:  Vital Signs Last 24 Hrs  T(C): 36.8 (08 Feb 2023 08:45), Max: 37.9 (07 Feb 2023 11:45)  T(F): 98.2 (08 Feb 2023 08:45), Max: 100.2 (07 Feb 2023 11:45)  HR: 89 (08 Feb 2023 08:45) (89 - 106)  BP: 100/66 (08 Feb 2023 08:45) (100/66 - 136/87)  BP(mean): --  RR: 18 (08 Feb 2023 08:45) (16 - 19)  SpO2: 96% (08 Feb 2023 08:45) (94% - 96%)    Parameters below as of 08 Feb 2023 04:40  Patient On (Oxygen Delivery Method): room air      Daily Height in cm: 149.86 (07 Feb 2023 18:14)    Daily     GENERAL:  No acute distress  HEENT:  NCAT, no scleral icterus  CHEST: no resp distress  HEART:  RRR  ABDOMEN:  Soft, non-tender, non-distended, normoactive bowel sounds. +TTP over the left rib   EXTREMITIES:  No cedema  NEURO:  Alert and oriented x 3,    LABS:                        12.7   5.30  )-----------( 126      ( 08 Feb 2023 07:17 )             36.6     Mean Cell Volume: 91.3 fl (02-08-23 @ 07:17)    02-08    140  |  108  |  9   ----------------------------<  112<H>  3.3<L>   |  25  |  0.51    Ca    8.4      08 Feb 2023 07:16  Phos  2.5     02-08  Mg     2.1     02-08    TPro  6.7  /  Alb  3.9  /  TBili  0.5  /  DBili  x   /  AST  22  /  ALT  22  /  AlkPhos  47  02-06    LIVER FUNCTIONS - ( 06 Feb 2023 11:39 )  Alb: 3.9 g/dL / Pro: 6.7 g/dL / ALK PHOS: 47 U/L / ALT: 22 U/L / AST: 22 U/L / GGT: x           Imaging:  < from: CT Abdomen and Pelvis w/ Oral Cont and w/ IV Cont (02.06.23 @ 17:43) >  IMPRESSION:  Nondisplaced fractures of the left anterolateral sixth, seventh and ninth   ribs. Mildly displaced fracture of the left eighth rib.    No other traumatic findings in the chest, abdomen or pelvis.    < end of copied text >  Nonspecific irregular wall thickening of the gastric body. Neoplasm   cannot be excluded. Correlation with endoscopy is suggested.

## 2023-02-08 NOTE — PROGRESS NOTE ADULT - SUBJECTIVE AND OBJECTIVE BOX
SURGERY DAILY PROGRESS NOTE:     SUBJECTIVE/ROS: Patient seen at bedside this AM. Doing well, mild pain around L. ribs. Pulling 1000 on IS.     24h Events:   - Overnight, no acute events    OBJECTIVE:  Vital Signs Last 24 Hrs  T(C): 36.4 (08 Feb 2023 04:40), Max: 37.9 (07 Feb 2023 11:45)  T(F): 97.6 (08 Feb 2023 04:40), Max: 100.2 (07 Feb 2023 11:45)  HR: 106 (08 Feb 2023 04:40) (90 - 106)  BP: 102/73 (08 Feb 2023 04:40) (102/73 - 136/87)  BP(mean): --  RR: 17 (08 Feb 2023 04:40) (16 - 19)  SpO2: 96% (08 Feb 2023 04:40) (94% - 96%)    Parameters below as of 08 Feb 2023 04:40  Patient On (Oxygen Delivery Method): room air      I&O's Detail    07 Feb 2023 07:01  -  08 Feb 2023 07:00  --------------------------------------------------------  IN:    Oral Fluid: 380 mL  Total IN: 380 mL    OUT:    Voided (mL): 402 mL  Total OUT: 402 mL    Total NET: -22 mL        Daily Height in cm: 149.86 (07 Feb 2023 18:14)    Daily   MEDICATIONS  (STANDING):  acetaminophen     Tablet .. 975 milliGRAM(s) Oral every 6 hours  bisacodyl 5 milliGRAM(s) Oral at bedtime  carvedilol 6.25 milliGRAM(s) Oral every 12 hours  enoxaparin Injectable 40 milliGRAM(s) SubCutaneous every 24 hours  lidocaine   4% Patch 1 Patch Transdermal daily  pantoprazole    Tablet 40 milliGRAM(s) Oral before breakfast  polyethylene glycol 3350 17 Gram(s) Oral every 24 hours  senna 2 Tablet(s) Oral at bedtime    MEDICATIONS  (PRN):  oxyCODONE    IR 2.5 milliGRAM(s) Oral every 6 hours PRN Moderate Pain (4 - 6)  oxyCODONE    IR 5 milliGRAM(s) Oral every 6 hours PRN Severe Pain (7 - 10)      LABS:                        12.7   5.30  )-----------( 126      ( 08 Feb 2023 07:17 )             36.6     02-08    140  |  108  |  9   ----------------------------<  112<H>  3.3<L>   |  25  |  0.51    Ca    8.4      08 Feb 2023 07:16  Phos  2.5     02-08  Mg     2.1     02-08    TPro  6.7  /  Alb  3.9  /  TBili  0.5  /  DBili  x   /  AST  22  /  ALT  22  /  AlkPhos  47  02-06        PHYSICAL EXAM:  Gen: AAOx3, non-toxic  Head: NCAT  HEENT: EOMI, oral mucosa moist, normal conjunctiva  Lung: Breathing on RA, unlabored   Abd: soft, NTND, no guarding.   MSK: no visible deformities  Neuro: No focal sensory or motor deficits

## 2023-02-08 NOTE — PROVIDER CONTACT NOTE (OTHER) - ASSESSMENT
Pt AOx4, denies any dizziness or lightheadedness. Pt resting comfortably, BP 93/59, Other VSS. Pt SBP has been 90s in ED
Bp 102/73  spo2 96 Temp 97.6. Pt is asymptomatic denies chest pain, palpitation, lightheadness

## 2023-02-08 NOTE — PROVIDER CONTACT NOTE (OTHER) - ACTION/TREATMENT ORDERED:
Felicity Whitten notified. Recheck BP in 30 mi and perform Ekg. Hold is BP is still under 110 systolic
MD aware, no new orders. Continue to monitor and notify of any changes

## 2023-02-08 NOTE — PROGRESS NOTE ADULT - ASSESSMENT
Juan Pablo Bowie is a 62 year old female with a PMHx of HTN, HLD and GERD presenting as a transfer from [facility] after fall with LOC s/p multiple left rib fractures with hospital course c/b new onset fevers. GI consulted for consideration of EGD for gastric wall thickening noted on CT.    #Gastric wall thickening noted on CT - ddx includes gastritis, PUD, gastric neoplasms  #Fever  #Syncope with LOC   Incidentally found to have nonspecific irregular wall thickening of the gastric body on CT chest obtained for traumatic workup as outlined above. Pt overall without any ongoing constitutional symptoms including early satiety, weight loss, n/v/abdominal pain with no ongoing melena/h-cehzia. Last EGD around 2 years ago with Dr. Gabriela Arechiga with mild esophagitis and bx with H. Pylori treated with a Quad therapy. Risk factors of PUD including ongoing ASA use. Overall,  irregular wall thickening of the gastric body relatively non-specific. Would reccomend endoscopic evaluation as an outpatient with her primary GI Dr. Gabriela Arechiga     Recommendation:  -Would recommend non-urgent endoscopic evaluation with EGD as an outpatient with her primary GI Dr. Gabriela Arechiga   -Diet as tolerated   -PO PPI QD   -Avoid NSAIDs     GI will plan to sign off at this time. Please feel free to reach out to our team with any follow up questions.   All recommendations are tentative until note is attested by attending.     Leodan Melo, PGY-4  Gastroenterology/Hepatology Fellow  Available on [employer] Teams   [phone] (Long Range Pager)  [phone] (Short Range Pager [facility])    After 5pm, please contact the on-call GI fellow. [phone]

## 2023-02-08 NOTE — CHART NOTE - NSCHARTNOTEFT_GEN_A_CORE
TERTIARY TRAUMA SURVEY  ------------------------------------------------------------------------------------    Date of TTS: 2/8/23   Time: 1030  Admit Date: 2/7/23    Trauma Activation: 3  Admit GCS: 15    HPI:   ID: [id number]    62F pmh acid reflux, HTN presenting as transfer from [facility] after ground level fall at 1am on 2/6. Patient reports she fell after getting up from bed to go to the bathroom, feels that the melatonin she took made her dizzy which she reports has happened in the past, however denies previous falls. No HS, No LOC. Called for boyfriend to help her up.    On arrival, primary intact, secondary significant for left chest wall tenderness. (06 Feb 2023 20:42)      INTERVAL EVENTS: ***    PAST MEDICAL & SURGICAL HISTORY:  HTN (hypertension)      Dyslipidemia      Hypertension      S/P knee replacement      No significant past surgical history          FAMILY HISTORY:      ALLERGIES: Medrol (Unknown)  Medrol Dosepak (Short breath)  Paxil (Flushing; Drowsiness)  Paxil (Unknown)  rifampin (Hives; Rash)  rifampin (Unknown)      CURRENT MEDICATIONS  acetaminophen     Tablet .. 975 milliGRAM(s) Oral every 6 hours  bisacodyl 5 milliGRAM(s) Oral at bedtime  carvedilol 6.25 milliGRAM(s) Oral every 12 hours  enoxaparin Injectable 40 milliGRAM(s) SubCutaneous every 24 hours  lidocaine   4% Patch 1 Patch Transdermal daily  oxyCODONE    IR 2.5 milliGRAM(s) Oral every 6 hours PRN  oxyCODONE    IR 5 milliGRAM(s) Oral every 6 hours PRN  pantoprazole    Tablet 40 milliGRAM(s) Oral before breakfast  polyethylene glycol 3350 17 Gram(s) Oral every 24 hours  potassium chloride    Tablet ER 40 milliEquivalent(s) Oral every 4 hours  potassium phosphate / sodium phosphate Powder (PHOS-NaK) 1 Packet(s) Oral once  senna 2 Tablet(s) Oral at bedtime    -----------------------------------------------------------------------------------    VITAL SIGNS:  T(C): 36.8 (02-08-23 @ 08:45), Max: 37.9 (02-07-23 @ 11:45)  HR: 89 (02-08-23 @ 08:45) (89 - 106)  BP: 100/66 (02-08-23 @ 08:45)  RR: 18 (02-08-23 @ 08:45) (16 - 19)  SpO2: 96% (02-08-23 @ 08:45) (94% - 96%)    02-07-23 @ 07:01  -  02-08-23 @ 07:00  --------------------------------------------------------  IN: 380 mL / OUT: 402 mL / NET: -22 mL      Weight (kg): 56 (02-07-23 @ 18:14)    PHYSICAL EXAM:  ***  General: NAD  HEENT: NC/AT; Normal inspection of eyes and nose; Moist mucous membranes, no oral lesions  Neck: Soft, supple, full ROM. No cervical or paraspinal tenderness.   Cardio: RRR.   Chest: Good effort, CTAB. No chest wall tenderness.  GI/Abd: Soft, NT/ND.  Vascular: Extremities warm; B/L UE and LE pulses 2+  Skin: No rashes; Normal color  Musculoskeletal: All 4 extremities moving spontaneously, no limitations. Full ROM of shoulders, elbows, wrists, fingers, knees, ankles bilaterally. No tenderness to palpation of joints or extremities.  Neuro: Strength 5/5 in B/L UE/LE. Sensation to light touch intact in B/L UE/LE.                CRANIAL NERVES: I - olfactory intact. II - normal visual acuity testing with Snellen. III/IV/VI - EOM's intact, painless. V - Normal sensation throughout 3 branches. VII - Normal and symmetric eyebrow raise; cheek puff symmetric; normal and symmetric smile; Normal strength with eye closing b/l. VIII - Hearing intact to whisper. IX/X - Normal palate rise, + gag reflex. XI - normal shoulder shrug, neck flexion & lateral rotation. XII - Normal and symmetric tongue protrusion.      LABS:      MICROBIOLOGY:      ------------------------------------------------------------------------------------------  RADIOLOGICAL FINDINGS REVIEW:   CXR:   Pelvis Films:    C-Spine Films:   T/L/S Spine Films:   Extremity Films:   Head CT:   C-Spine CT:   Neck CT:   Chest CT:   ABD/Pelvis CT:   Other:     List Injuries Identified to Date:      - Left 6-9 anterior rib fx  - Incidentally found gastric wall thickening    List Operative and Interventional Radiological Procedures:     INTERPRETATION/ASSESSMENT:   62F presenting after ground level fall with the following injuries, rib score 1, IS 1000:    - Rib protocol management, avoiding NSAIDS per GI recs  - Home carvedilol resumed  - Appreciate GI recs for incidental gastric thickening; out-pt GI-follow up t  - Diet: Regular diet  - Bowel regimen  - Lovenox ppx  - Am labs  - D/C today

## 2023-02-08 NOTE — PROGRESS NOTE ADULT - ASSESSMENT
62F presenting after ground level fall with the following injuries, rib score 1, IS 1000:    - Left 6-9 anterior rib fx  - Incidentally found gastric wall thickening    Plan:  - Rib protocol management, avoiding NSAIDS per GI recs  - Home carvedilol resumed  - Appreciate GI recs for incidental gastric thickening; out-pt GI-follow up t  - Diet: Regular diet  - Bowel regimen  - Lovenox ppx  - Am labs  - D/C today     ATP  [id number]

## 2023-02-08 NOTE — DISCHARGE NOTE NURSING/CASE MANAGEMENT/SOCIAL WORK - PATIENT PORTAL LINK FT
You can access the FollowMyHealth Patient Portal offered by [facility] by registering at the following website: [email or web address]. By joining [facility]’s FollowMyHealth portal, you will also be able to view your health information using other applications (apps) compatible with our system.

## 2023-02-09 VITALS
HEART RATE: 96 BPM | TEMPERATURE: 98 F | OXYGEN SATURATION: 97 % | SYSTOLIC BLOOD PRESSURE: 121 MMHG | DIASTOLIC BLOOD PRESSURE: 79 MMHG | RESPIRATION RATE: 18 BRPM

## 2023-02-09 LAB
ANION GAP SERPL CALC-SCNC: 8 MMOL/L — SIGNIFICANT CHANGE UP (ref 5–17)
BUN SERPL-MCNC: 7 MG/DL — SIGNIFICANT CHANGE UP (ref 7–23)
CALCIUM SERPL-MCNC: 8.8 MG/DL — SIGNIFICANT CHANGE UP (ref 8.4–10.5)
CHLORIDE SERPL-SCNC: 108 MMOL/L — SIGNIFICANT CHANGE UP (ref 96–108)
CO2 SERPL-SCNC: 24 MMOL/L — SIGNIFICANT CHANGE UP (ref 22–31)
CREAT SERPL-MCNC: 0.52 MG/DL — SIGNIFICANT CHANGE UP (ref 0.5–1.3)
EGFR: 104 ML/MIN/1.73M2 — SIGNIFICANT CHANGE UP
GLUCOSE SERPL-MCNC: 96 MG/DL — SIGNIFICANT CHANGE UP (ref 70–99)
HCT VFR BLD CALC: 36.5 % — SIGNIFICANT CHANGE UP (ref 34.5–45)
HGB BLD-MCNC: 12.6 G/DL — SIGNIFICANT CHANGE UP (ref 11.5–15.5)
MAGNESIUM SERPL-MCNC: 1.9 MG/DL — SIGNIFICANT CHANGE UP (ref 1.6–2.6)
MCHC RBC-ENTMCNC: 31.5 PG — SIGNIFICANT CHANGE UP (ref 27–34)
MCHC RBC-ENTMCNC: 34.5 GM/DL — SIGNIFICANT CHANGE UP (ref 32–36)
MCV RBC AUTO: 91.3 FL — SIGNIFICANT CHANGE UP (ref 80–100)
NRBC # BLD: 0 /100 WBCS — SIGNIFICANT CHANGE UP (ref 0–0)
PHOSPHATE SERPL-MCNC: 2.3 MG/DL — LOW (ref 2.5–4.5)
PLATELET # BLD AUTO: 143 K/UL — LOW (ref 150–400)
POTASSIUM SERPL-MCNC: 3.7 MMOL/L — SIGNIFICANT CHANGE UP (ref 3.5–5.3)
POTASSIUM SERPL-SCNC: 3.7 MMOL/L — SIGNIFICANT CHANGE UP (ref 3.5–5.3)
RBC # BLD: 4 M/UL — SIGNIFICANT CHANGE UP (ref 3.8–5.2)
RBC # FLD: 12.4 % — SIGNIFICANT CHANGE UP (ref 10.3–14.5)
SODIUM SERPL-SCNC: 140 MMOL/L — SIGNIFICANT CHANGE UP (ref 135–145)
WBC # BLD: 4.03 K/UL — SIGNIFICANT CHANGE UP (ref 3.8–10.5)
WBC # FLD AUTO: 4.03 K/UL — SIGNIFICANT CHANGE UP (ref 3.8–10.5)

## 2023-02-09 PROCEDURE — 97116 GAIT TRAINING THERAPY: CPT

## 2023-02-09 PROCEDURE — 74177 CT ABD & PELVIS W/CONTRAST: CPT | Mod: MA

## 2023-02-09 PROCEDURE — 99285 EMERGENCY DEPT VISIT HI MDM: CPT

## 2023-02-09 PROCEDURE — 85027 COMPLETE CBC AUTOMATED: CPT

## 2023-02-09 PROCEDURE — 96374 THER/PROPH/DIAG INJ IV PUSH: CPT

## 2023-02-09 PROCEDURE — 99232 SBSQ HOSP IP/OBS MODERATE 35: CPT

## 2023-02-09 PROCEDURE — 97110 THERAPEUTIC EXERCISES: CPT

## 2023-02-09 PROCEDURE — 83690 ASSAY OF LIPASE: CPT

## 2023-02-09 PROCEDURE — 87637 SARSCOV2&INF A&B&RSV AMP PRB: CPT

## 2023-02-09 PROCEDURE — 71260 CT THORAX DX C+: CPT | Mod: MA

## 2023-02-09 PROCEDURE — 80048 BASIC METABOLIC PNL TOTAL CA: CPT

## 2023-02-09 PROCEDURE — 85025 COMPLETE CBC W/AUTO DIFF WBC: CPT

## 2023-02-09 PROCEDURE — 86900 BLOOD TYPING SEROLOGIC ABO: CPT

## 2023-02-09 PROCEDURE — 83735 ASSAY OF MAGNESIUM: CPT

## 2023-02-09 PROCEDURE — 96375 TX/PRO/DX INJ NEW DRUG ADDON: CPT

## 2023-02-09 PROCEDURE — 86850 RBC ANTIBODY SCREEN: CPT

## 2023-02-09 PROCEDURE — 84100 ASSAY OF PHOSPHORUS: CPT

## 2023-02-09 PROCEDURE — 71045 X-RAY EXAM CHEST 1 VIEW: CPT

## 2023-02-09 PROCEDURE — 36415 COLL VENOUS BLD VENIPUNCTURE: CPT

## 2023-02-09 PROCEDURE — 97530 THERAPEUTIC ACTIVITIES: CPT

## 2023-02-09 PROCEDURE — 86803 HEPATITIS C AB TEST: CPT

## 2023-02-09 PROCEDURE — 86901 BLOOD TYPING SEROLOGIC RH(D): CPT

## 2023-02-09 PROCEDURE — 80053 COMPREHEN METABOLIC PANEL: CPT

## 2023-02-09 PROCEDURE — 96361 HYDRATE IV INFUSION ADD-ON: CPT

## 2023-02-09 PROCEDURE — 97161 PT EVAL LOW COMPLEX 20 MIN: CPT

## 2023-02-09 RX ORDER — SODIUM,POTASSIUM PHOSPHATES 278-250MG
2 POWDER IN PACKET (EA) ORAL ONCE
Refills: 0 | Status: COMPLETED | OUTPATIENT
Start: 2023-02-09 | End: 2023-02-09

## 2023-02-09 RX ADMIN — PANTOPRAZOLE SODIUM 40 MILLIGRAM(S): 20 TABLET, DELAYED RELEASE ORAL at 05:36

## 2023-02-09 RX ADMIN — Medication 975 MILLIGRAM(S): at 00:58

## 2023-02-09 RX ADMIN — Medication 975 MILLIGRAM(S): at 00:00

## 2023-02-09 RX ADMIN — Medication 975 MILLIGRAM(S): at 06:36

## 2023-02-09 RX ADMIN — Medication 975 MILLIGRAM(S): at 18:28

## 2023-02-09 RX ADMIN — CARVEDILOL PHOSPHATE 6.25 MILLIGRAM(S): 80 CAPSULE, EXTENDED RELEASE ORAL at 05:36

## 2023-02-09 RX ADMIN — ENOXAPARIN SODIUM 40 MILLIGRAM(S): 100 INJECTION SUBCUTANEOUS at 18:29

## 2023-02-09 RX ADMIN — Medication 5 MILLIGRAM(S): at 00:58

## 2023-02-09 RX ADMIN — Medication 2 TABLET(S): at 14:07

## 2023-02-09 RX ADMIN — Medication 975 MILLIGRAM(S): at 15:03

## 2023-02-09 RX ADMIN — Medication 975 MILLIGRAM(S): at 14:03

## 2023-02-09 RX ADMIN — Medication 975 MILLIGRAM(S): at 05:36

## 2023-02-09 RX ADMIN — LIDOCAINE 1 PATCH: 4 CREAM TOPICAL at 14:03

## 2023-02-09 RX ADMIN — Medication 975 MILLIGRAM(S): at 00:37

## 2023-02-09 NOTE — PROGRESS NOTE ADULT - ATTENDING COMMENTS
Pt is a 62 year old female with a medical history significant for CAD/HTN who presents in transfer from [facility] after a ground level fall. Work up revealed four left rib fx. Rib score is 1. Pt [id number] on IS. No acute events overnight.    A/p  S/p fall  Four left rib fx  Multimodal pain control  PT evaluation  Discharge planning.
Pt is a 62 year old female with a medical history significant for CAD/HTN who presents in transfer from [facility] after a ground level fall. Work up revealed four left rib fx. Rib score is 1. Pt [id number] on IS. No acute events overnight. Pain is improved    A/p  S/p fall  Four left rib fx  Multimodal pain control  PT evaluation --> Home PT  Discharge planning for home with home PT.   Outpt work up of thickened stomach by GI service.
Agree with above. Pending fever workup, EGD for gastric thickening (without any clinical symptoms) can be done inpatient later this week if she's still inpatient, vs outpatient.
Agree with above. Pt with no GI complaints, can follow-up as outpatient for EGD as outpatient for incidental CT finding.
Pt is a 62 year old female with a medical history significant for CAD/HTN who presents in transfer from [facility] after a ground level fall. Work up revealed four left rib fx. Rib score is 1. Pt pulls1.5L on IS. No acute events overnight. Pain is improved    WBC 4  Hgb 12.6  PO4 2.3    A/p  S/p fall  Four left rib fx  Continue multimodal pain control  Reg diet  Replace and monitor lytes  PT evaluation --> Home PT  Discharge planning for home with home PT.   Outpt work up of thickened stomach by GI service.

## 2023-02-09 NOTE — PROGRESS NOTE ADULT - SUBJECTIVE AND OBJECTIVE BOX
SURGERY DAILY PROGRESS NOTE:     SUBJECTIVE/ROS: Patient seen at bedside this AM. Doing well, pain improved. Pulling 1500 on IS     24h Events:   - Overnight, no acute events    OBJECTIVE:  Vital Signs Last 24 Hrs  T(C): 36.9 (09 Feb 2023 00:29), Max: 36.9 (09 Feb 2023 00:29)  T(F): 98.5 (09 Feb 2023 00:29), Max: 98.5 (09 Feb 2023 00:29)  HR: 86 (09 Feb 2023 00:29) (82 - 95)  BP: 118/87 (09 Feb 2023 00:29) (100/66 - 120/85)  BP(mean): --  RR: 18 (09 Feb 2023 00:29) (17 - 18)  SpO2: 96% (09 Feb 2023 00:29) (95% - 96%)    Parameters below as of 09 Feb 2023 00:29  Patient On (Oxygen Delivery Method): room air      I&O's Detail    07 Feb 2023 07:01  -  08 Feb 2023 07:00  --------------------------------------------------------  IN:    Oral Fluid: 380 mL  Total IN: 380 mL    OUT:    Voided (mL): 402 mL  Total OUT: 402 mL    Total NET: -22 mL      08 Feb 2023 07:01  -  09 Feb 2023 06:23  --------------------------------------------------------  IN:    Oral Fluid: 480 mL  Total IN: 480 mL    OUT:  Total OUT: 0 mL    Total NET: 480 mL        Daily     Daily   MEDICATIONS  (STANDING):  acetaminophen     Tablet .. 975 milliGRAM(s) Oral every 6 hours  bisacodyl 5 milliGRAM(s) Oral at bedtime  carvedilol 6.25 milliGRAM(s) Oral every 12 hours  enoxaparin Injectable 40 milliGRAM(s) SubCutaneous every 24 hours  lidocaine   4% Patch 1 Patch Transdermal daily  pantoprazole    Tablet 40 milliGRAM(s) Oral before breakfast  polyethylene glycol 3350 17 Gram(s) Oral every 24 hours  senna 2 Tablet(s) Oral at bedtime    MEDICATIONS  (PRN):  traMADol 25 milliGRAM(s) Oral every 4 hours PRN Moderate Pain (4 - 6)  traMADol 50 milliGRAM(s) Oral every 4 hours PRN Severe Pain (7 - 10)      LABS:                        12.7   5.30  )-----------( 126      ( 08 Feb 2023 07:17 )             36.6     02-08    140  |  108  |  9   ----------------------------<  112<H>  3.3<L>   |  25  |  0.51    Ca    8.4      08 Feb 2023 07:16  Phos  2.5     02-08  Mg     2.1     02-08          PHYSICAL EXAM:  Gen: AAOx3, non-toxic  Head: NCAT  HEENT: EOMI, oral mucosa moist, normal conjunctiva  Lung: Breathing on RA, unlabored   Abd: soft, NTND, no guarding.   MSK: no visible deformities  Neuro: No focal sensory or motor deficits

## 2023-02-09 NOTE — PROGRESS NOTE ADULT - ASSESSMENT
62F presenting after ground level fall with the following injuries, rib score 1, IS 1000:    - Left 6-9 anterior rib fx  - Incidentally found gastric wall thickening    Plan:  - Rib protocol management, avoiding NSAIDS per GI recs  - Home carvedilol resumed  - Appreciate GI recs for incidental gastric thickening; out-pt GI-follow up   - Diet: Regular diet  - Bowel regimen  - Lovenox ppx  - Am labs     ATP  [id number] 62F presenting after ground level fall with the following injuries, rib score 1, IS 1000:    - Left 6-9 anterior rib fx  - Incidentally found gastric wall thickening    Plan:  - Rib protocol management, avoiding NSAIDS per GI recs  - Home carvedilol resumed  - Appreciate GI recs for incidental gastric thickening; out-pt GI-follow up   - Diet: Regular diet  - Bowel regimen  - Lovenox ppx  - Am labs   - discharge home today    ATP  [id number]

## 2023-03-08 NOTE — H&P ADULT - NSHPPOAPULMEMBOLUS_GEN_A_CORE
Physical Therapy Evaluation    Patient Name:  Ana Lake   MRN:  [id number]    Recommendations:     Discharge Recommendations: nursing facility, skilled, rehabilitation facility (pending progress)   Discharge Equipment Recommendations: none   Barriers to discharge:  impaired mobility and endurance    Assessment:     Ana Lake is a 88 y.o. female admitted with a medical diagnosis of Acute on chronic congestive heart failure and COVID-19.  She presents with the following impairments/functional limitations: weakness, impaired endurance, impaired self care skills, impaired functional mobility. Patient tolerated PT evaluation well. Patient's spO2 prior to ambulation was 97% on 6L nasal canula. Patient able to transfer sit<>stand with HHA and Denise. Patient ambulated 30ft with HHA and Denise. Patient ambulated with increased medial/lateral trunk sway and required multiple standing rest breaks due to reports shortness of breath, spO2 remained  >93% throughout session. Patient transferred stand<>sit with Denise. Patient demonstrated intact sensation and strength WNL in BLEs. Patient is appropriate for skilled acute PT to increase endurance and independence with functional mobility. PT recommending IP rehab vs SNF upon discharge.     Rehab Prognosis: Good; patient would benefit from acute skilled PT services to address these deficits and reach maximum level of function.    Recent Surgery: * No surgery found *      Plan:     During this hospitalization, patient to be seen 5-6 x/week to address the identified rehab impairments via gait training, therapeutic activities, therapeutic exercises, neuromuscular re-education and progress toward the following goals:    Plan of Care Expires:  04/08/23    Subjective     Chief Complaint: SOB  Patient/Family Comments/goals: return to PLOF  Pain/Comfort:  Pain Rating 1: 0/10    Patients cultural, spiritual, Muslim conflicts given the current situation: no    Living  Environment:  Patient lives in single story home by herself with one small ledge to enter.   Prior to admission, patients level of function was [place].  Equipment used at home: walker, rolling.  DME owned (not currently used): rolling walker and rollator .  Upon discharge, patient will have assistance from daughters (retired).    Objective:     Communicated with RN prior to session.  Patient found up in chair with oxygen, PureWick, peripheral IV, pulse ox (continuous), telemetry  upon PT entry to room.    General Precautions: Standard, droplet, contact, airborne, fall (COVID-19)  Orthopedic Precautions:N/A   Braces: N/A  Respiratory Status: Nasal cannula, flow 6 L/min    Exams:  Sensation: -       Intact  RLE ROM: WNL  RLE Strength: WNL  LLE ROM: WNL  LLE Strength: WNL    Functional Mobility:  Transfers:  Sit to Stand:  minimum assistance with hand-held assist  Gait: x30ft with HHA and Denise, multiple standing rest breaks to take deep breaths  Balance: good static standing balance with BUE support      AM-PAC 6 CLICK MOBILITY  Total Score:17     Patient left up in chair with all lines intact, call button in reach, and RN notified.    GOALS:   Multidisciplinary Problems       Physical Therapy Goals          Problem: Physical Therapy    Goal Priority Disciplines Outcome Goal Variances Interventions   Physical Therapy Goal    High PT, PT/OT Ongoing, Progressing     Description: Goals to be met by: 2023     Patient will increase functional independence with mobility by performin. Sit to stand transfer with [place]  2. Gait  x 200 feet with Modified [place] using LRAD.  3. Ascend/descend 4 stairs with right Handrails Modified [place].                          History:     No past medical history on file.    No past surgical history on file.    Time Tracking:     PT Received On: 23  PT Start Time: 1319     PT Stop Time: 1336  PT Total Time (min): 17 min     Billable Minutes: Evaluation  moderate complexity       03/08/2023   no

## 2023-05-22 ENCOUNTER — APPOINTMENT (OUTPATIENT)
Dept: NEUROLOGY | Facility: CLINIC | Age: 63
End: 2023-05-22

## 2023-06-04 NOTE — ED ADULT NURSE NOTE - NSIMPLEMENTINTERV_GEN_ALL_ED
Implemented All Universal Safety Interventions:  [place] to call system. Call bell, personal items and telephone within reach. Instruct patient to call for assistance. Room bathroom lighting operational. Non-slip footwear when patient is off stretcher. Physically safe environment: no spills, clutter or unnecessary equipment. Stretcher in lowest position, wheels locked, appropriate side rails in place.
normal...

## 2023-11-17 NOTE — ED ADULT NURSE NOTE - OBJECTIVE STATEMENT
Angioplasty of the proximal left anterior descending lesion. Inflation 1: Pressure = 12 lisa; Duration = 10 sec. Inflation 2: Pressure = 12 lisa; Duration = 20 sec. First encounter with the pt, pt received from triage with complaints of being transferred from [place] for a trauma. Pt fell in the bathroom and found by her boyfriend. Pt was reported that she has multiple rib fractures  is a/ox4 and verbal. Speech is clear and able to speak in full sentences without distress. Airway is patent with no obstruction nor blocking secretions. Breathing is even and unlabored on room air. Pt has strong pulses palpated bilaterally. Neuro check is wnl. Full rom but have pain during ROM. . Pt denies chest pain, n/v and sob. No acute distress noted. Pt has call light within the reach of the pt at the bedside. Will continue to monitor the pt.

## 2024-02-27 ENCOUNTER — EMERGENCY (EMERGENCY)
Facility: HOSPITAL | Age: 64
LOS: 1 days | Discharge: HOME | End: 2024-02-27
Attending: EMERGENCY MEDICINE
Payer: MEDICAID

## 2024-02-27 VITALS
WEIGHT: 125 LBS | SYSTOLIC BLOOD PRESSURE: 115 MMHG | DIASTOLIC BLOOD PRESSURE: 81 MMHG | OXYGEN SATURATION: 98 % | HEART RATE: 77 BPM | TEMPERATURE: 98 F | RESPIRATION RATE: 16 BRPM

## 2024-02-27 VITALS
RESPIRATION RATE: 18 BRPM | TEMPERATURE: 98 F | DIASTOLIC BLOOD PRESSURE: 79 MMHG | SYSTOLIC BLOOD PRESSURE: 126 MMHG | HEART RATE: 78 BPM | OXYGEN SATURATION: 98 %

## 2024-02-27 DIAGNOSIS — Z96.659 PRESENCE OF UNSPECIFIED ARTIFICIAL KNEE JOINT: Chronic | ICD-10-CM

## 2024-02-27 PROBLEM — I10 ESSENTIAL (PRIMARY) HYPERTENSION: Chronic | Status: ACTIVE | Noted: 2023-02-06

## 2024-02-27 LAB
ALBUMIN SERPL ELPH-MCNC: 3.6 G/DL — SIGNIFICANT CHANGE UP (ref 3.5–5)
ALP SERPL-CCNC: 63 U/L — SIGNIFICANT CHANGE UP (ref 40–120)
ALT FLD-CCNC: 28 U/L DA — SIGNIFICANT CHANGE UP (ref 10–60)
ANION GAP SERPL CALC-SCNC: 6 MMOL/L — SIGNIFICANT CHANGE UP (ref 5–17)
AST SERPL-CCNC: 15 U/L — SIGNIFICANT CHANGE UP (ref 10–40)
BASOPHILS # BLD AUTO: 0.03 K/UL — SIGNIFICANT CHANGE UP (ref 0–0.2)
BASOPHILS NFR BLD AUTO: 0.6 % — SIGNIFICANT CHANGE UP (ref 0–2)
BILIRUB SERPL-MCNC: 0.4 MG/DL — SIGNIFICANT CHANGE UP (ref 0.2–1.2)
BUN SERPL-MCNC: 6 MG/DL — LOW (ref 7–18)
CALCIUM SERPL-MCNC: 9.1 MG/DL — SIGNIFICANT CHANGE UP (ref 8.4–10.5)
CHLORIDE SERPL-SCNC: 104 MMOL/L — SIGNIFICANT CHANGE UP (ref 96–108)
CO2 SERPL-SCNC: 27 MMOL/L — SIGNIFICANT CHANGE UP (ref 22–31)
CREAT SERPL-MCNC: 0.75 MG/DL — SIGNIFICANT CHANGE UP (ref 0.5–1.3)
D DIMER BLD IA.RAPID-MCNC: <150 NG/ML DDU — SIGNIFICANT CHANGE UP
EGFR: 89 ML/MIN/1.73M2 — SIGNIFICANT CHANGE UP
EOSINOPHIL # BLD AUTO: 0.18 K/UL — SIGNIFICANT CHANGE UP (ref 0–0.5)
EOSINOPHIL NFR BLD AUTO: 3.7 % — SIGNIFICANT CHANGE UP (ref 0–6)
GLUCOSE SERPL-MCNC: 173 MG/DL — HIGH (ref 70–99)
HCT VFR BLD CALC: 39.5 % — SIGNIFICANT CHANGE UP (ref 34.5–45)
HGB BLD-MCNC: 13.4 G/DL — SIGNIFICANT CHANGE UP (ref 11.5–15.5)
HPIV3 RNA SPEC QL NAA+PROBE: DETECTED
IMM GRANULOCYTES NFR BLD AUTO: 0.2 % — SIGNIFICANT CHANGE UP (ref 0–0.9)
LYMPHOCYTES # BLD AUTO: 1.02 K/UL — SIGNIFICANT CHANGE UP (ref 1–3.3)
LYMPHOCYTES # BLD AUTO: 21.2 % — SIGNIFICANT CHANGE UP (ref 13–44)
MCHC RBC-ENTMCNC: 31.5 PG — SIGNIFICANT CHANGE UP (ref 27–34)
MCHC RBC-ENTMCNC: 33.9 GM/DL — SIGNIFICANT CHANGE UP (ref 32–36)
MCV RBC AUTO: 92.9 FL — SIGNIFICANT CHANGE UP (ref 80–100)
MONOCYTES # BLD AUTO: 0.4 K/UL — SIGNIFICANT CHANGE UP (ref 0–0.9)
MONOCYTES NFR BLD AUTO: 8.3 % — SIGNIFICANT CHANGE UP (ref 2–14)
NEUTROPHILS # BLD AUTO: 3.18 K/UL — SIGNIFICANT CHANGE UP (ref 1.8–7.4)
NEUTROPHILS NFR BLD AUTO: 66 % — SIGNIFICANT CHANGE UP (ref 43–77)
NRBC # BLD: 0 /100 WBCS — SIGNIFICANT CHANGE UP (ref 0–0)
PLATELET # BLD AUTO: 161 K/UL — SIGNIFICANT CHANGE UP (ref 150–400)
POTASSIUM SERPL-MCNC: 4.1 MMOL/L — SIGNIFICANT CHANGE UP (ref 3.5–5.3)
POTASSIUM SERPL-SCNC: 4.1 MMOL/L — SIGNIFICANT CHANGE UP (ref 3.5–5.3)
PROT SERPL-MCNC: 7.6 G/DL — SIGNIFICANT CHANGE UP (ref 6–8.3)
RAPID RVP RESULT: DETECTED
RBC # BLD: 4.25 M/UL — SIGNIFICANT CHANGE UP (ref 3.8–5.2)
RBC # FLD: 11.9 % — SIGNIFICANT CHANGE UP (ref 10.3–14.5)
SARS-COV-2 RNA SPEC QL NAA+PROBE: SIGNIFICANT CHANGE UP
SODIUM SERPL-SCNC: 137 MMOL/L — SIGNIFICANT CHANGE UP (ref 135–145)
TROPONIN I, HIGH SENSITIVITY RESULT: 4.5 NG/L — SIGNIFICANT CHANGE UP
WBC # BLD: 4.82 K/UL — SIGNIFICANT CHANGE UP (ref 3.8–10.5)
WBC # FLD AUTO: 4.82 K/UL — SIGNIFICANT CHANGE UP (ref 3.8–10.5)

## 2024-02-27 PROCEDURE — 71046 X-RAY EXAM CHEST 2 VIEWS: CPT

## 2024-02-27 PROCEDURE — 71046 X-RAY EXAM CHEST 2 VIEWS: CPT | Mod: 26

## 2024-02-27 PROCEDURE — 71250 CT THORAX DX C-: CPT | Mod: MC

## 2024-02-27 PROCEDURE — 36415 COLL VENOUS BLD VENIPUNCTURE: CPT

## 2024-02-27 PROCEDURE — 71250 CT THORAX DX C-: CPT | Mod: 26,MC

## 2024-02-27 PROCEDURE — 80053 COMPREHEN METABOLIC PANEL: CPT

## 2024-02-27 PROCEDURE — 85379 FIBRIN DEGRADATION QUANT: CPT

## 2024-02-27 PROCEDURE — 99285 EMERGENCY DEPT VISIT HI MDM: CPT | Mod: 25

## 2024-02-27 PROCEDURE — 85025 COMPLETE CBC W/AUTO DIFF WBC: CPT

## 2024-02-27 PROCEDURE — 0225U NFCT DS DNA&RNA 21 SARSCOV2: CPT

## 2024-02-27 PROCEDURE — 94640 AIRWAY INHALATION TREATMENT: CPT

## 2024-02-27 PROCEDURE — 99285 EMERGENCY DEPT VISIT HI MDM: CPT

## 2024-02-27 PROCEDURE — 93005 ELECTROCARDIOGRAM TRACING: CPT

## 2024-02-27 PROCEDURE — 84484 ASSAY OF TROPONIN QUANT: CPT

## 2024-02-27 RX ORDER — HYDROCODONE BITARTRATE AND HOMATROPINE METHYLBROMIDE 5; 1.5 MG/5ML; MG/5ML
1 SOLUTION ORAL
Qty: 8 | Refills: 0
Start: 2024-02-27 | End: 2024-02-29

## 2024-02-27 RX ORDER — ALBUTEROL 90 UG/1
2 AEROSOL, METERED ORAL
Qty: 1 | Refills: 0
Start: 2024-02-27

## 2024-02-27 RX ORDER — IPRATROPIUM/ALBUTEROL SULFATE 18-103MCG
3 AEROSOL WITH ADAPTER (GRAM) INHALATION ONCE
Refills: 0 | Status: COMPLETED | OUTPATIENT
Start: 2024-02-27 | End: 2024-02-27

## 2024-02-27 RX ADMIN — Medication 3 MILLILITER(S): at 11:34

## 2024-02-27 RX ADMIN — Medication 3 MILLILITER(S): at 11:37

## 2024-02-27 NOTE — ED PROVIDER NOTE - OBJECTIVE STATEMENT
63-year-old female, history of hypertension, non-smoker, referred by PMD for further evaluation of cough for 1 month.  Gradual onset of intermittent cough.  Slightly productive with mostly clear mucus recently had a few episodes of blood-tinged mucus.  For the last 2 days every time she coughs she has sharp midsternal localized chest pain.  Also reports some mild shortness of breath for the last 2 days.  Denies any fever but associated with chills.  No night sweats.  Denies any leg swelling or pain.  No recent long distance travel, immobilization or surgery.  No history of HRT.  Was evaluated by PMD and was started on doxycycline which she is taking for 2 days. Also prescribed Tessalon Perles but reports no relief Yesterday had an outpatient chest x-ray but no results.  Overnight had severe cough and could not sleep which was the reason why PMD referred to the ER.

## 2024-02-27 NOTE — ED PROVIDER NOTE - NSFOLLOWUPINSTRUCTIONS_ED_ALL_ED_FT
Follow up with the   Primary care doctor within 2-3 days.  Bring your results so they can see what was done today.  STOP taking the benzonatate (cough medicine).  Instead you will receive a different cough medicine.  The new cough medicine prescription can make you feel sleepy.  Take the medication with food.  Continue taking the doxycycline (antibiotic).  At nighttime use air humidifier.    If you experience any new or worsening symptoms or if you are concerned you can always come back to the emergency for a re-evaluation.    If there were any prescriptions given to you during the visit today take them as prescribed. If you have any questions you can ask the pharmacist.

## 2024-02-27 NOTE — ED ADULT NURSE NOTE - IS THE PATIENT ABLE TO BE SCREENED?
Sty (or Stye)  A sty is an infection of the oil gland of the eyelid. It may develop into a small pocket of pus (an abscess). This can cause pain, redness, and swelling.  In early stages, a sty is treated with antibiotic cream, eye drops, or a small towel © 5245-4188 [employer] [address]. [address] [facility] [place] [place]. All rights reserved. This information is not intended as a substitute for professional medical care. Always follow your healthcare professional's instructions. Yes

## 2024-02-27 NOTE — ED ADULT NURSE NOTE - OBJECTIVE STATEMENT
Patient presented to ED as referral from [facility] for 1 month of cough, denies any fever and verbalized that she feels chest pain when coughing.

## 2024-02-27 NOTE — ED PROVIDER NOTE - CLINICAL SUMMARY MEDICAL DECISION MAKING FREE TEXT BOX
63-year-old female, presents with persistent intermittent cough for 1 month.  In last 2 days reports some chest pain during cough with mild shortness of breath and intermittent slight blood-tinged mucus.  On exam nontoxic-appearing, vital signs stable, afebrile.  Lungs CTA.  Patient will need an EKG to assess for ischemic changes.  Will do D-dimer.  Will also do labs including 1 troponin although low clinical suspicion for ACS.  Patient will need chest x-ray to assess for pneumonia, RVP.  Will treat with the DuoNebs and cough medicine and reassess. 63-year-old female, presents with persistent intermittent cough for 1 month.  In last 2 days reports some chest pain during cough with mild shortness of breath and intermittent slight blood-tinged mucus.  On exam nontoxic-appearing, vital signs stable, afebrile.  Lungs CTA.  Patient will need an EKG to assess for ischemic changes.  Will do D-dimer.  Will also do labs including 1 troponin although low clinical suspicion for ACS.  Patient will need chest x-ray to assess for pneumonia, RVP.  Will treat with the DuoNebs and cough medicine and reassess.    14: 37–patient very well-appearing, feels significant improvement of symptoms.  Chest x-ray and CT shows no signs of pneumonia.  Incidental finding of thyroid nodule and renal lesion.  Informed of incidental findings.  RVP positive for parainfluenza.  EKG shows NSR without any ischemic changes.  Trope negative.  D-dimer negative.  At this time symptoms consistent with acute bronchitis likely viral however given timing of symptoms for approximately 1 month will advise for patient to continue the prescribed  doxycycline.  Will advised to stop benzonatate and instead will give a different medication for cough.  Will give an additional albuterol pump.  Discussed red flags to come back to the hospital.  Strongly advised to follow-up with PMD within 2-3 days.

## 2024-02-27 NOTE — ED PROVIDER NOTE - PATIENT PORTAL LINK FT
security/safe
You can access the FollowMyHealth Patient Portal offered by [facility] by registering at the following website: [email or web address]. By joining [employer]’s FollowMyHealth portal, you will also be able to view your health information using other applications (apps) compatible with our system.

## 2024-03-03 NOTE — ED PROVIDER NOTE - ENMT, MLM
This department to follow up as schedule permits to assess for diet tolerance. Medical team further advised to reconsult if there is a change in medical status and/or observed change in patient's tolerance of recommended PO diet.
Airway patent, Nasal mucosa clear. Mouth with normal mucosa. Throat has no vesicles, no oropharyngeal exudates and uvula is midline.

## 2024-04-12 ENCOUNTER — APPOINTMENT (OUTPATIENT)
Dept: PULMONOLOGY | Facility: CLINIC | Age: 64
End: 2024-04-12
Payer: MEDICAID

## 2024-04-12 VITALS
OXYGEN SATURATION: 96 % | HEIGHT: 59 IN | HEART RATE: 78 BPM | DIASTOLIC BLOOD PRESSURE: 76 MMHG | WEIGHT: 121 LBS | TEMPERATURE: 97.1 F | SYSTOLIC BLOOD PRESSURE: 121 MMHG | BODY MASS INDEX: 24.39 KG/M2

## 2024-04-12 PROCEDURE — 71046 X-RAY EXAM CHEST 2 VIEWS: CPT

## 2024-04-12 PROCEDURE — 99213 OFFICE O/P EST LOW 20 MIN: CPT | Mod: 25

## 2024-04-13 NOTE — REASON FOR VISIT
[Follow-Up] : a follow-up visit [TextBox_44] : patient told to see pulmonologist by [facility]

## 2024-04-13 NOTE — DISCUSSION/SUMMARY
[FreeTextEntry1] : bizarre visit we think she was seen to have rib fractures, which are remote and told to see pulmonary  have no idea why she had the chest film in the first place

## 2024-04-13 NOTE — HISTORY OF PRESENT ILLNESS
[TextBox_4] : patient was seen in the past and found to have no pulmonary disease by dr. seymour vargas horrenus historian saw doc in [place] for ??? reason and told to see pulmonaory  she does not know why, but it was based upon chest film finding  we find old fractures, thats all, is that why she was told to see us